# Patient Record
Sex: FEMALE | Race: WHITE | NOT HISPANIC OR LATINO
[De-identification: names, ages, dates, MRNs, and addresses within clinical notes are randomized per-mention and may not be internally consistent; named-entity substitution may affect disease eponyms.]

---

## 2017-02-08 ENCOUNTER — RESULT REVIEW (OUTPATIENT)
Age: 27
End: 2017-02-08

## 2017-02-09 ENCOUNTER — TRANSCRIPTION ENCOUNTER (OUTPATIENT)
Age: 27
End: 2017-02-09

## 2017-02-09 ENCOUNTER — INPATIENT (INPATIENT)
Facility: HOSPITAL | Age: 27
LOS: 3 days | Discharge: HOME | End: 2017-02-13
Attending: OBSTETRICS & GYNECOLOGY | Admitting: OBSTETRICS & GYNECOLOGY
Payer: COMMERCIAL

## 2017-02-09 ENCOUNTER — EMERGENCY (EMERGENCY)
Facility: HOSPITAL | Age: 27
LOS: 1 days | Discharge: STILL ADMITTED OR ADMINISTRATIVE | End: 2017-02-09
Admitting: EMERGENCY MEDICINE

## 2017-02-09 VITALS — HEIGHT: 64 IN | WEIGHT: 169.76 LBS

## 2017-02-09 VITALS
DIASTOLIC BLOOD PRESSURE: 92 MMHG | HEART RATE: 94 BPM | TEMPERATURE: 98 F | RESPIRATION RATE: 18 BRPM | OXYGEN SATURATION: 100 % | SYSTOLIC BLOOD PRESSURE: 191 MMHG

## 2017-02-09 DIAGNOSIS — O26.90 PREGNANCY RELATED CONDITIONS, UNSPECIFIED, UNSPECIFIED TRIMESTER: ICD-10-CM

## 2017-02-09 DIAGNOSIS — Z3A.00 WEEKS OF GESTATION OF PREGNANCY NOT SPECIFIED: ICD-10-CM

## 2017-02-09 LAB
ALBUMIN SERPL ELPH-MCNC: 3 G/DL — LOW (ref 3.3–5)
ALP SERPL-CCNC: 118 U/L — SIGNIFICANT CHANGE UP (ref 40–120)
ALT FLD-CCNC: 36 U/L — HIGH (ref 4–33)
APTT BLD: 25.7 SEC — LOW (ref 27.5–37.4)
AST SERPL-CCNC: 41 U/L — HIGH (ref 4–32)
BILIRUB SERPL-MCNC: 0.2 MG/DL — SIGNIFICANT CHANGE UP (ref 0.2–1.2)
BUN SERPL-MCNC: 8 MG/DL — SIGNIFICANT CHANGE UP (ref 7–23)
CALCIUM SERPL-MCNC: 10 MG/DL — SIGNIFICANT CHANGE UP (ref 8.4–10.5)
CHLORIDE SERPL-SCNC: 98 MMOL/L — SIGNIFICANT CHANGE UP (ref 98–107)
CO2 SERPL-SCNC: 18 MMOL/L — LOW (ref 22–31)
CREAT SERPL-MCNC: 0.59 MG/DL — SIGNIFICANT CHANGE UP (ref 0.5–1.3)
FIBRINOGEN PPP-MCNC: 569 MG/DL — HIGH (ref 255–510)
GLUCOSE SERPL-MCNC: 116 MG/DL — HIGH (ref 70–99)
INR BLD: 0.98 — SIGNIFICANT CHANGE UP (ref 0.87–1.18)
LDH SERPL L TO P-CCNC: 229 U/L — HIGH (ref 135–225)
POTASSIUM SERPL-MCNC: 4 MMOL/L — SIGNIFICANT CHANGE UP (ref 3.5–5.3)
POTASSIUM SERPL-SCNC: 4 MMOL/L — SIGNIFICANT CHANGE UP (ref 3.5–5.3)
PROT SERPL-MCNC: 6 G/DL — SIGNIFICANT CHANGE UP (ref 6–8.3)
PROTHROM AB SERPL-ACNC: 11.2 SEC — SIGNIFICANT CHANGE UP (ref 10–13.1)
SODIUM SERPL-SCNC: 136 MMOL/L — SIGNIFICANT CHANGE UP (ref 135–145)
URATE SERPL-MCNC: 5.4 MG/DL — SIGNIFICANT CHANGE UP (ref 2.5–7)

## 2017-02-09 PROCEDURE — 88307 TISSUE EXAM BY PATHOLOGIST: CPT | Mod: 26

## 2017-02-09 PROCEDURE — 59514 CESAREAN DELIVERY ONLY: CPT | Mod: 80,U8

## 2017-02-09 RX ORDER — SODIUM CHLORIDE 9 MG/ML
1000 INJECTION, SOLUTION INTRAVENOUS ONCE
Qty: 0 | Refills: 0 | Status: DISCONTINUED | OUTPATIENT
Start: 2017-02-09 | End: 2017-02-10

## 2017-02-09 RX ORDER — SODIUM CHLORIDE 9 MG/ML
1000 INJECTION, SOLUTION INTRAVENOUS ONCE
Qty: 0 | Refills: 0 | Status: DISCONTINUED | OUTPATIENT
Start: 2017-02-09 | End: 2017-02-09

## 2017-02-09 RX ORDER — METOCLOPRAMIDE HCL 10 MG
10 TABLET ORAL ONCE
Qty: 0 | Refills: 0 | Status: DISCONTINUED | OUTPATIENT
Start: 2017-02-09 | End: 2017-02-10

## 2017-02-09 RX ORDER — OXYCODONE HYDROCHLORIDE 5 MG/1
10 TABLET ORAL
Qty: 0 | Refills: 0 | Status: DISCONTINUED | OUTPATIENT
Start: 2017-02-10 | End: 2017-02-10

## 2017-02-09 RX ORDER — OXYCODONE HYDROCHLORIDE 5 MG/1
5 TABLET ORAL
Qty: 0 | Refills: 0 | Status: DISCONTINUED | OUTPATIENT
Start: 2017-02-10 | End: 2017-02-10

## 2017-02-09 RX ORDER — CITRIC ACID/SODIUM CITRATE 300-500 MG
30 SOLUTION, ORAL ORAL ONCE
Qty: 0 | Refills: 0 | Status: DISCONTINUED | OUTPATIENT
Start: 2017-02-09 | End: 2017-02-09

## 2017-02-09 RX ORDER — FAMOTIDINE 10 MG/ML
20 INJECTION INTRAVENOUS ONCE
Qty: 0 | Refills: 0 | Status: DISCONTINUED | OUTPATIENT
Start: 2017-02-09 | End: 2017-02-09

## 2017-02-09 RX ORDER — CITRIC ACID/SODIUM CITRATE 300-500 MG
30 SOLUTION, ORAL ORAL ONCE
Qty: 0 | Refills: 0 | Status: COMPLETED | OUTPATIENT
Start: 2017-02-09 | End: 2017-02-09

## 2017-02-09 RX ORDER — METOCLOPRAMIDE HCL 10 MG
10 TABLET ORAL ONCE
Qty: 0 | Refills: 0 | Status: DISCONTINUED | OUTPATIENT
Start: 2017-02-09 | End: 2017-02-09

## 2017-02-09 RX ORDER — SODIUM CHLORIDE 9 MG/ML
1000 INJECTION, SOLUTION INTRAVENOUS
Qty: 0 | Refills: 0 | Status: DISCONTINUED | OUTPATIENT
Start: 2017-02-09 | End: 2017-02-10

## 2017-02-09 RX ORDER — SODIUM CHLORIDE 9 MG/ML
1000 INJECTION, SOLUTION INTRAVENOUS
Qty: 0 | Refills: 0 | Status: DISCONTINUED | OUTPATIENT
Start: 2017-02-09 | End: 2017-02-09

## 2017-02-09 NOTE — ED ADULT NURSE NOTE - CHIEF COMPLAINT QUOTE
Pt brought in by [facility] c/o passage of a clot and now having vaginal bleeding x 1 1/2 hours. Denies trauma, pain or contraction. RAMIREZ 2/21/2017. L&D provider called and pt transported to L&D

## 2017-02-09 NOTE — ED ADULT TRIAGE NOTE - CHIEF COMPLAINT QUOTE
Pt brought in by [facility] c/o passage of a clot and now having vaginal bleeding x 1 1/2 hours. Denies trauma, pain or contraction. RAMIREZ 2/21/2017. L&D provider called and pt transported to L&D

## 2017-02-10 ENCOUNTER — TRANSCRIPTION ENCOUNTER (OUTPATIENT)
Age: 27
End: 2017-02-10

## 2017-02-10 LAB
BASOPHILS # BLD AUTO: 0.02 K/UL — SIGNIFICANT CHANGE UP (ref 0–0.2)
BASOPHILS NFR BLD AUTO: 0.2 % — SIGNIFICANT CHANGE UP (ref 0–2)
BASOPHILS NFR SPEC: 0 % — SIGNIFICANT CHANGE UP (ref 0–2)
EOSINOPHIL # BLD AUTO: 0.08 K/UL — SIGNIFICANT CHANGE UP (ref 0–0.5)
EOSINOPHIL NFR BLD AUTO: 0.9 % — SIGNIFICANT CHANGE UP (ref 0–6)
EOSINOPHIL NFR FLD: 0 % — SIGNIFICANT CHANGE UP (ref 0–6)
GIANT PLATELETS BLD QL SMEAR: PRESENT — SIGNIFICANT CHANGE UP
HCT VFR BLD CALC: 25 % — LOW (ref 34.5–45)
HCT VFR BLD CALC: 28.4 % — LOW (ref 34.5–45)
HCT VFR BLD CALC: 33.5 % — LOW (ref 34.5–45)
HGB BLD-MCNC: 10 G/DL — LOW (ref 11.5–15.5)
HGB BLD-MCNC: 11.6 G/DL — SIGNIFICANT CHANGE UP (ref 11.5–15.5)
HGB BLD-MCNC: 8.7 G/DL — LOW (ref 11.5–15.5)
IMM GRANULOCYTES NFR BLD AUTO: 0.7 % — SIGNIFICANT CHANGE UP (ref 0–1.5)
LYMPHOCYTES # BLD AUTO: 1.95 K/UL — SIGNIFICANT CHANGE UP (ref 1–3.3)
LYMPHOCYTES # BLD AUTO: 22.4 % — SIGNIFICANT CHANGE UP (ref 13–44)
LYMPHOCYTES NFR SPEC AUTO: 18 % — SIGNIFICANT CHANGE UP (ref 13–44)
MANUAL SMEAR VERIFICATION: SIGNIFICANT CHANGE UP
MANUAL SMEAR VERIFICATION: SIGNIFICANT CHANGE UP
MCHC RBC-ENTMCNC: 32 PG — SIGNIFICANT CHANGE UP (ref 27–34)
MCHC RBC-ENTMCNC: 32.3 PG — SIGNIFICANT CHANGE UP (ref 27–34)
MCHC RBC-ENTMCNC: 32.3 PG — SIGNIFICANT CHANGE UP (ref 27–34)
MCHC RBC-ENTMCNC: 34.6 % — SIGNIFICANT CHANGE UP (ref 32–36)
MCHC RBC-ENTMCNC: 34.8 % — SIGNIFICANT CHANGE UP (ref 32–36)
MCHC RBC-ENTMCNC: 35.2 % — SIGNIFICANT CHANGE UP (ref 32–36)
MCV RBC AUTO: 91.6 FL — SIGNIFICANT CHANGE UP (ref 80–100)
MCV RBC AUTO: 92.3 FL — SIGNIFICANT CHANGE UP (ref 80–100)
MCV RBC AUTO: 92.9 FL — SIGNIFICANT CHANGE UP (ref 80–100)
MONOCYTES # BLD AUTO: 0.94 K/UL — HIGH (ref 0–0.9)
MONOCYTES NFR BLD AUTO: 10.8 % — SIGNIFICANT CHANGE UP (ref 2–14)
MONOCYTES NFR BLD: 8 % — SIGNIFICANT CHANGE UP (ref 2–9)
MORPHOLOGY BLD-IMP: NORMAL — SIGNIFICANT CHANGE UP
NEUTROPHIL AB SER-ACNC: 73 % — SIGNIFICANT CHANGE UP (ref 43–77)
NEUTROPHILS # BLD AUTO: 5.66 K/UL — SIGNIFICANT CHANGE UP (ref 1.8–7.4)
NEUTROPHILS NFR BLD AUTO: 65 % — SIGNIFICANT CHANGE UP (ref 43–77)
NEUTS BAND # BLD: 1 % — SIGNIFICANT CHANGE UP (ref 0–6)
PLATELET # BLD AUTO: 133 K/UL — LOW (ref 150–400)
PLATELET # BLD AUTO: 135 K/UL — LOW (ref 150–400)
PLATELET # BLD AUTO: 169 K/UL — SIGNIFICANT CHANGE UP (ref 150–400)
PLATELET COUNT - ESTIMATE: NORMAL — SIGNIFICANT CHANGE UP
PMV BLD: 11.5 FL — SIGNIFICANT CHANGE UP (ref 7–13)
PMV BLD: 11.6 FL — SIGNIFICANT CHANGE UP (ref 7–13)
PMV BLD: 11.9 FL — SIGNIFICANT CHANGE UP (ref 7–13)
RBC # BLD: 2.69 M/UL — LOW (ref 3.8–5.2)
RBC # BLD: 3.1 M/UL — LOW (ref 3.8–5.2)
RBC # BLD: 3.63 M/UL — LOW (ref 3.8–5.2)
RBC # FLD: 12.8 % — SIGNIFICANT CHANGE UP (ref 10.3–14.5)
RBC # FLD: 12.8 % — SIGNIFICANT CHANGE UP (ref 10.3–14.5)
RBC # FLD: 13.1 % — SIGNIFICANT CHANGE UP (ref 10.3–14.5)
RH IG SCN BLD-IMP: POSITIVE — SIGNIFICANT CHANGE UP
T PALLIDUM AB TITR SER: NEGATIVE — SIGNIFICANT CHANGE UP
WBC # BLD: 11.05 K/UL — HIGH (ref 3.8–10.5)
WBC # BLD: 14.8 K/UL — HIGH (ref 3.8–10.5)
WBC # BLD: 8.71 K/UL — SIGNIFICANT CHANGE UP (ref 3.8–10.5)
WBC # FLD AUTO: 11.05 K/UL — HIGH (ref 3.8–10.5)
WBC # FLD AUTO: 14.8 K/UL — HIGH (ref 3.8–10.5)
WBC # FLD AUTO: 8.71 K/UL — SIGNIFICANT CHANGE UP (ref 3.8–10.5)

## 2017-02-10 RX ORDER — IBUPROFEN 200 MG
1 TABLET ORAL
Qty: 60 | Refills: 1
Start: 2017-02-10 | End: 2017-03-11

## 2017-02-10 RX ORDER — FERROUS SULFATE 325(65) MG
325 TABLET ORAL DAILY
Qty: 0 | Refills: 0 | Status: DISCONTINUED | OUTPATIENT
Start: 2017-02-10 | End: 2017-02-13

## 2017-02-10 RX ORDER — SODIUM CHLORIDE 9 MG/ML
1000 INJECTION, SOLUTION INTRAVENOUS ONCE
Qty: 0 | Refills: 0 | Status: COMPLETED | OUTPATIENT
Start: 2017-02-10 | End: 2017-02-10

## 2017-02-10 RX ORDER — DOCUSATE SODIUM 100 MG
100 CAPSULE ORAL
Qty: 0 | Refills: 0 | Status: DISCONTINUED | OUTPATIENT
Start: 2017-02-10 | End: 2017-02-13

## 2017-02-10 RX ORDER — OXYTOCIN 10 UNIT/ML
41.67 VIAL (ML) INJECTION
Qty: 20 | Refills: 0 | Status: DISCONTINUED | OUTPATIENT
Start: 2017-02-10 | End: 2017-02-10

## 2017-02-10 RX ORDER — SODIUM CHLORIDE 9 MG/ML
1000 INJECTION, SOLUTION INTRAVENOUS
Qty: 0 | Refills: 0 | Status: DISCONTINUED | OUTPATIENT
Start: 2017-02-10 | End: 2017-02-13

## 2017-02-10 RX ORDER — TETANUS TOXOID, REDUCED DIPHTHERIA TOXOID AND ACELLULAR PERTUSSIS VACCINE, ADSORBED 5; 2.5; 8; 8; 2.5 [IU]/.5ML; [IU]/.5ML; UG/.5ML; UG/.5ML; UG/.5ML
0.5 SUSPENSION INTRAMUSCULAR ONCE
Qty: 0 | Refills: 0 | Status: DISCONTINUED | OUTPATIENT
Start: 2017-02-10 | End: 2017-02-13

## 2017-02-10 RX ORDER — DIPHENHYDRAMINE HCL 50 MG
25 CAPSULE ORAL EVERY 6 HOURS
Qty: 0 | Refills: 0 | Status: DISCONTINUED | OUTPATIENT
Start: 2017-02-10 | End: 2017-02-13

## 2017-02-10 RX ORDER — IBUPROFEN 200 MG
600 TABLET ORAL EVERY 6 HOURS
Qty: 0 | Refills: 0 | Status: DISCONTINUED | OUTPATIENT
Start: 2017-02-10 | End: 2017-02-13

## 2017-02-10 RX ORDER — GLYCERIN ADULT
1 SUPPOSITORY, RECTAL RECTAL AT BEDTIME
Qty: 0 | Refills: 0 | Status: DISCONTINUED | OUTPATIENT
Start: 2017-02-10 | End: 2017-02-13

## 2017-02-10 RX ORDER — HEPARIN SODIUM 5000 [USP'U]/ML
5000 INJECTION INTRAVENOUS; SUBCUTANEOUS EVERY 12 HOURS
Qty: 0 | Refills: 0 | Status: DISCONTINUED | OUTPATIENT
Start: 2017-02-10 | End: 2017-02-13

## 2017-02-10 RX ORDER — LANOLIN
1 OINTMENT (GRAM) TOPICAL
Qty: 0 | Refills: 0 | Status: DISCONTINUED | OUTPATIENT
Start: 2017-02-10 | End: 2017-02-13

## 2017-02-10 RX ORDER — SODIUM CHLORIDE 9 MG/ML
1000 INJECTION, SOLUTION INTRAVENOUS
Qty: 0 | Refills: 0 | Status: DISCONTINUED | OUTPATIENT
Start: 2017-02-10 | End: 2017-02-10

## 2017-02-10 RX ORDER — OXYCODONE HYDROCHLORIDE 5 MG/1
5 TABLET ORAL EVERY 4 HOURS
Qty: 0 | Refills: 0 | Status: DISCONTINUED | OUTPATIENT
Start: 2017-02-10 | End: 2017-02-10

## 2017-02-10 RX ORDER — SIMETHICONE 80 MG/1
80 TABLET, CHEWABLE ORAL EVERY 4 HOURS
Qty: 0 | Refills: 0 | Status: DISCONTINUED | OUTPATIENT
Start: 2017-02-10 | End: 2017-02-13

## 2017-02-10 RX ORDER — OXYCODONE AND ACETAMINOPHEN 5; 325 MG/1; MG/1
1 TABLET ORAL
Qty: 28 | Refills: 0
Start: 2017-02-10 | End: 2017-02-17

## 2017-02-10 RX ORDER — OXYCODONE HYDROCHLORIDE 5 MG/1
5 TABLET ORAL
Qty: 0 | Refills: 0 | Status: DISCONTINUED | OUTPATIENT
Start: 2017-02-10 | End: 2017-02-13

## 2017-02-10 RX ORDER — KETOROLAC TROMETHAMINE 30 MG/ML
30 SYRINGE (ML) INJECTION EVERY 6 HOURS
Qty: 0 | Refills: 0 | Status: DISCONTINUED | OUTPATIENT
Start: 2017-02-10 | End: 2017-02-11

## 2017-02-10 RX ORDER — ACETAMINOPHEN 500 MG
975 TABLET ORAL EVERY 6 HOURS
Qty: 0 | Refills: 0 | Status: DISCONTINUED | OUTPATIENT
Start: 2017-02-10 | End: 2017-02-13

## 2017-02-10 RX ORDER — OXYCODONE HYDROCHLORIDE 5 MG/1
5 TABLET ORAL EVERY 4 HOURS
Qty: 0 | Refills: 0 | Status: DISCONTINUED | OUTPATIENT
Start: 2017-02-10 | End: 2017-02-13

## 2017-02-10 RX ORDER — OXYTOCIN 10 UNIT/ML
333.33 VIAL (ML) INJECTION
Qty: 20 | Refills: 0 | Status: DISCONTINUED | OUTPATIENT
Start: 2017-02-10 | End: 2017-02-10

## 2017-02-10 RX ADMIN — Medication 975 MILLIGRAM(S): at 19:29

## 2017-02-10 RX ADMIN — Medication 30 MILLIGRAM(S): at 14:13

## 2017-02-10 RX ADMIN — SODIUM CHLORIDE 2000 MILLILITER(S): 9 INJECTION, SOLUTION INTRAVENOUS at 02:22

## 2017-02-10 RX ADMIN — Medication 30 MILLIGRAM(S): at 07:50

## 2017-02-10 RX ADMIN — HEPARIN SODIUM 5000 UNIT(S): 5000 INJECTION INTRAVENOUS; SUBCUTANEOUS at 18:59

## 2017-02-10 RX ADMIN — Medication 325 MILLIGRAM(S): at 14:13

## 2017-02-10 RX ADMIN — OXYCODONE HYDROCHLORIDE 5 MILLIGRAM(S): 5 TABLET ORAL at 18:59

## 2017-02-10 RX ADMIN — Medication 30 MILLIGRAM(S): at 14:43

## 2017-02-10 RX ADMIN — Medication 975 MILLIGRAM(S): at 18:59

## 2017-02-10 RX ADMIN — OXYCODONE HYDROCHLORIDE 5 MILLIGRAM(S): 5 TABLET ORAL at 19:29

## 2017-02-10 RX ADMIN — Medication 30 MILLIGRAM(S): at 06:55

## 2017-02-10 RX ADMIN — HEPARIN SODIUM 5000 UNIT(S): 5000 INJECTION INTRAVENOUS; SUBCUTANEOUS at 05:15

## 2017-02-10 RX ADMIN — Medication 1 TABLET(S): at 14:14

## 2017-02-10 NOTE — DISCHARGE NOTE OB - MEDICATION SUMMARY - MEDICATIONS TO TAKE
I will START or STAY ON the medications listed below when I get home from the hospital:    ibuprofen 600 mg oral tablet  -- 1 tab(s) by mouth every 6 hours, As Needed -for moderate pain  -- Do not take this drug if you are pregnant.  It is very important that you take or use this exactly as directed.  Do not skip doses or discontinue unless directed by your doctor.  May cause drowsiness or dizziness.  Obtain medical advice before taking any non-prescription drugs as some may affect the action of this medication.  Take with food or milk.    -- Indication: For Pain    Percocet 5/325 325 mg-5 mg oral tablet  -- 1 tab(s) by mouth every 6 hours, As Needed -for severe pain MDD:4 tabs  -- Caution federal law prohibits the transfer of this drug to any person other  than the person for whom it was prescribed.  May cause drowsiness.  Alcohol may intensify this effect.  Use care when operating dangerous machinery.  This prescription cannot be refilled.  This product contains acetaminophen.  Do not use  with any other product containing acetaminophen to prevent possible liver damage.  Using more of this medication than prescribed may cause serious breathing problems.    -- Indication: For Pain

## 2017-02-10 NOTE — DISCHARGE NOTE OB - PATIENT PORTAL LINK FT
“You can access the Follow[employer] Patient Portal, offered by [facility], by registering with the following website: [email or web address]”

## 2017-02-10 NOTE — DISCHARGE NOTE OB - CARE PLAN
Principal Discharge DX:	 delivery delivered  Goal:	Routine  Instructions for follow-up, activity and diet:	Regular

## 2017-02-10 NOTE — DISCHARGE NOTE OB - CARE PROVIDER_API CALL
Vaishali Antunez (MD), OBSGYN  General  [address] [place] [address]  Phone: [phone]  Fax: [phone]

## 2017-02-11 RX ADMIN — Medication 600 MILLIGRAM(S): at 01:20

## 2017-02-11 RX ADMIN — OXYCODONE HYDROCHLORIDE 5 MILLIGRAM(S): 5 TABLET ORAL at 00:33

## 2017-02-11 RX ADMIN — Medication 100 MILLIGRAM(S): at 00:33

## 2017-02-11 RX ADMIN — Medication 975 MILLIGRAM(S): at 19:49

## 2017-02-11 RX ADMIN — Medication 600 MILLIGRAM(S): at 18:08

## 2017-02-11 RX ADMIN — Medication 975 MILLIGRAM(S): at 06:26

## 2017-02-11 RX ADMIN — Medication 325 MILLIGRAM(S): at 19:47

## 2017-02-11 RX ADMIN — HEPARIN SODIUM 5000 UNIT(S): 5000 INJECTION INTRAVENOUS; SUBCUTANEOUS at 18:08

## 2017-02-11 RX ADMIN — Medication 1 TABLET(S): at 19:49

## 2017-02-11 RX ADMIN — Medication 975 MILLIGRAM(S): at 05:52

## 2017-02-11 RX ADMIN — Medication 600 MILLIGRAM(S): at 19:48

## 2017-02-11 RX ADMIN — OXYCODONE HYDROCHLORIDE 5 MILLIGRAM(S): 5 TABLET ORAL at 01:20

## 2017-02-11 RX ADMIN — Medication 975 MILLIGRAM(S): at 18:09

## 2017-02-11 RX ADMIN — SIMETHICONE 80 MILLIGRAM(S): 80 TABLET, CHEWABLE ORAL at 02:33

## 2017-02-11 RX ADMIN — Medication 600 MILLIGRAM(S): at 05:52

## 2017-02-11 RX ADMIN — OXYCODONE HYDROCHLORIDE 5 MILLIGRAM(S): 5 TABLET ORAL at 10:54

## 2017-02-11 RX ADMIN — HEPARIN SODIUM 5000 UNIT(S): 5000 INJECTION INTRAVENOUS; SUBCUTANEOUS at 05:52

## 2017-02-11 RX ADMIN — OXYCODONE HYDROCHLORIDE 5 MILLIGRAM(S): 5 TABLET ORAL at 05:52

## 2017-02-11 RX ADMIN — Medication 600 MILLIGRAM(S): at 00:33

## 2017-02-11 RX ADMIN — OXYCODONE HYDROCHLORIDE 5 MILLIGRAM(S): 5 TABLET ORAL at 11:15

## 2017-02-11 RX ADMIN — Medication 975 MILLIGRAM(S): at 00:34

## 2017-02-11 RX ADMIN — Medication 600 MILLIGRAM(S): at 06:26

## 2017-02-11 RX ADMIN — Medication 975 MILLIGRAM(S): at 01:20

## 2017-02-11 RX ADMIN — OXYCODONE HYDROCHLORIDE 5 MILLIGRAM(S): 5 TABLET ORAL at 06:26

## 2017-02-11 NOTE — LACTATION INITIAL EVALUATION - INTERVENTION OUTCOME
Worked with mother to try to latch  and  not latching and over 24 hours , mother to triple feed, discussed to breastfeed 8-12 times in 24 hours and then hand express and give 10-15cc of colostrum and if not getting enough to give formula, discussed breastfeeding log and assist prn, Rn aware of plan/demonstrates understanding of teaching/good return demonstration/verbalizes understanding

## 2017-02-12 RX ADMIN — Medication 600 MILLIGRAM(S): at 12:46

## 2017-02-12 RX ADMIN — Medication 600 MILLIGRAM(S): at 07:35

## 2017-02-12 RX ADMIN — Medication 1 TABLET(S): at 12:19

## 2017-02-12 RX ADMIN — Medication 975 MILLIGRAM(S): at 12:45

## 2017-02-12 RX ADMIN — Medication 100 MILLIGRAM(S): at 21:09

## 2017-02-12 RX ADMIN — Medication 975 MILLIGRAM(S): at 01:40

## 2017-02-12 RX ADMIN — Medication 600 MILLIGRAM(S): at 06:36

## 2017-02-12 RX ADMIN — Medication 600 MILLIGRAM(S): at 23:55

## 2017-02-12 RX ADMIN — Medication 975 MILLIGRAM(S): at 12:20

## 2017-02-12 RX ADMIN — Medication 975 MILLIGRAM(S): at 18:19

## 2017-02-12 RX ADMIN — Medication 600 MILLIGRAM(S): at 00:44

## 2017-02-12 RX ADMIN — Medication 975 MILLIGRAM(S): at 06:35

## 2017-02-12 RX ADMIN — HEPARIN SODIUM 5000 UNIT(S): 5000 INJECTION INTRAVENOUS; SUBCUTANEOUS at 06:35

## 2017-02-12 RX ADMIN — Medication 600 MILLIGRAM(S): at 12:19

## 2017-02-12 RX ADMIN — Medication 100 MILLIGRAM(S): at 00:44

## 2017-02-12 RX ADMIN — Medication 325 MILLIGRAM(S): at 12:19

## 2017-02-12 RX ADMIN — Medication 600 MILLIGRAM(S): at 01:40

## 2017-02-12 RX ADMIN — Medication 975 MILLIGRAM(S): at 23:56

## 2017-02-12 RX ADMIN — SIMETHICONE 80 MILLIGRAM(S): 80 TABLET, CHEWABLE ORAL at 01:45

## 2017-02-12 RX ADMIN — Medication 975 MILLIGRAM(S): at 19:00

## 2017-02-12 RX ADMIN — Medication 975 MILLIGRAM(S): at 00:43

## 2017-02-12 RX ADMIN — HEPARIN SODIUM 5000 UNIT(S): 5000 INJECTION INTRAVENOUS; SUBCUTANEOUS at 18:20

## 2017-02-12 RX ADMIN — Medication 975 MILLIGRAM(S): at 07:35

## 2017-02-12 RX ADMIN — Medication 600 MILLIGRAM(S): at 19:00

## 2017-02-12 RX ADMIN — Medication 600 MILLIGRAM(S): at 18:18

## 2017-02-13 VITALS
TEMPERATURE: 98 F | OXYGEN SATURATION: 99 % | DIASTOLIC BLOOD PRESSURE: 67 MMHG | HEART RATE: 73 BPM | SYSTOLIC BLOOD PRESSURE: 116 MMHG | RESPIRATION RATE: 18 BRPM

## 2017-02-13 RX ADMIN — HEPARIN SODIUM 5000 UNIT(S): 5000 INJECTION INTRAVENOUS; SUBCUTANEOUS at 06:52

## 2017-02-13 RX ADMIN — Medication 600 MILLIGRAM(S): at 06:45

## 2017-02-13 RX ADMIN — Medication 325 MILLIGRAM(S): at 13:25

## 2017-02-13 RX ADMIN — Medication 975 MILLIGRAM(S): at 06:45

## 2017-02-13 RX ADMIN — Medication 600 MILLIGRAM(S): at 14:00

## 2017-02-13 RX ADMIN — Medication 1 APPLICATION(S): at 13:25

## 2017-02-13 RX ADMIN — Medication 975 MILLIGRAM(S): at 07:45

## 2017-02-13 RX ADMIN — Medication 1 TABLET(S): at 13:24

## 2017-02-13 RX ADMIN — Medication 600 MILLIGRAM(S): at 07:45

## 2017-02-13 RX ADMIN — Medication 600 MILLIGRAM(S): at 13:25

## 2017-02-13 RX ADMIN — Medication 975 MILLIGRAM(S): at 13:25

## 2017-02-13 RX ADMIN — Medication 975 MILLIGRAM(S): at 14:00

## 2017-02-13 RX ADMIN — Medication 975 MILLIGRAM(S): at 00:55

## 2017-02-13 RX ADMIN — Medication 600 MILLIGRAM(S): at 00:55

## 2017-03-07 ENCOUNTER — TRANSCRIPTION ENCOUNTER (OUTPATIENT)
Age: 27
End: 2017-03-07

## 2018-08-17 NOTE — PATIENT PROFILE OB - NSTOBACCONEVERSMOKERY/N_GEN_A
Please notify patient that her Lyme antibody screen was negative.  Would suggest that she follow-up with her primary care provider if she wants to do a repeat Lyme titer in 6 weeks as we discussed.  Dot Izaguirre NP     No

## 2019-04-23 ENCOUNTER — TRANSCRIPTION ENCOUNTER (OUTPATIENT)
Age: 29
End: 2019-04-23

## 2022-10-07 ENCOUNTER — ASOB RESULT (OUTPATIENT)
Age: 32
End: 2022-10-07

## 2022-10-07 ENCOUNTER — APPOINTMENT (OUTPATIENT)
Dept: ANTEPARTUM | Facility: CLINIC | Age: 32
End: 2022-10-07

## 2022-10-07 PROCEDURE — 76813 OB US NUCHAL MEAS 1 GEST: CPT

## 2022-11-18 ENCOUNTER — APPOINTMENT (OUTPATIENT)
Dept: ANTEPARTUM | Facility: CLINIC | Age: 32
End: 2022-11-18

## 2022-11-18 ENCOUNTER — ASOB RESULT (OUTPATIENT)
Age: 32
End: 2022-11-18

## 2022-11-18 PROCEDURE — 99202 OFFICE O/P NEW SF 15 MIN: CPT | Mod: 25

## 2022-11-18 PROCEDURE — 76811 OB US DETAILED SNGL FETUS: CPT

## 2023-01-06 ENCOUNTER — APPOINTMENT (OUTPATIENT)
Dept: ANTEPARTUM | Facility: CLINIC | Age: 33
End: 2023-01-06
Payer: COMMERCIAL

## 2023-01-06 ENCOUNTER — ASOB RESULT (OUTPATIENT)
Age: 33
End: 2023-01-06

## 2023-01-06 PROCEDURE — 76816 OB US FOLLOW-UP PER FETUS: CPT

## 2023-01-06 PROCEDURE — 76819 FETAL BIOPHYS PROFIL W/O NST: CPT | Mod: 59

## 2023-02-02 ENCOUNTER — ASOB RESULT (OUTPATIENT)
Age: 33
End: 2023-02-02

## 2023-02-02 ENCOUNTER — APPOINTMENT (OUTPATIENT)
Dept: ANTEPARTUM | Facility: CLINIC | Age: 33
End: 2023-02-02
Payer: COMMERCIAL

## 2023-02-02 PROCEDURE — 76816 OB US FOLLOW-UP PER FETUS: CPT

## 2023-02-02 PROCEDURE — 76817 TRANSVAGINAL US OBSTETRIC: CPT

## 2023-02-02 PROCEDURE — 76819 FETAL BIOPHYS PROFIL W/O NST: CPT | Mod: 59

## 2023-02-27 ENCOUNTER — ASOB RESULT (OUTPATIENT)
Age: 33
End: 2023-02-27

## 2023-02-27 ENCOUNTER — APPOINTMENT (OUTPATIENT)
Dept: ANTEPARTUM | Facility: CLINIC | Age: 33
End: 2023-02-27
Payer: COMMERCIAL

## 2023-02-27 PROCEDURE — 76816 OB US FOLLOW-UP PER FETUS: CPT

## 2023-02-27 PROCEDURE — 76819 FETAL BIOPHYS PROFIL W/O NST: CPT | Mod: 59

## 2023-03-07 ENCOUNTER — APPOINTMENT (OUTPATIENT)
Dept: ANTEPARTUM | Facility: CLINIC | Age: 33
End: 2023-03-07

## 2023-03-14 ENCOUNTER — APPOINTMENT (OUTPATIENT)
Dept: ANTEPARTUM | Facility: CLINIC | Age: 33
End: 2023-03-14

## 2023-03-14 ENCOUNTER — ASOB RESULT (OUTPATIENT)
Age: 33
End: 2023-03-14

## 2023-03-14 ENCOUNTER — EMERGENCY (EMERGENCY)
Facility: HOSPITAL | Age: 33
LOS: 1 days | Discharge: STILL ADMITTED OR ADMINISTRATIVE | End: 2023-03-14
Admitting: EMERGENCY MEDICINE
Payer: COMMERCIAL

## 2023-03-14 ENCOUNTER — OUTPATIENT (OUTPATIENT)
Dept: INPATIENT UNIT | Facility: HOSPITAL | Age: 33
LOS: 1 days | Discharge: HOME | End: 2023-03-14
Payer: COMMERCIAL

## 2023-03-14 VITALS
HEART RATE: 84 BPM | DIASTOLIC BLOOD PRESSURE: 66 MMHG | TEMPERATURE: 98 F | RESPIRATION RATE: 26 BRPM | SYSTOLIC BLOOD PRESSURE: 109 MMHG

## 2023-03-14 VITALS
SYSTOLIC BLOOD PRESSURE: 125 MMHG | TEMPERATURE: 98 F | DIASTOLIC BLOOD PRESSURE: 89 MMHG | OXYGEN SATURATION: 100 % | RESPIRATION RATE: 17 BRPM | HEART RATE: 89 BPM

## 2023-03-14 VITALS — DIASTOLIC BLOOD PRESSURE: 64 MMHG | HEART RATE: 90 BPM | SYSTOLIC BLOOD PRESSURE: 110 MMHG

## 2023-03-14 DIAGNOSIS — O26.899 OTHER SPECIFIED PREGNANCY RELATED CONDITIONS, UNSPECIFIED TRIMESTER: ICD-10-CM

## 2023-03-14 PROCEDURE — 99221 1ST HOSP IP/OBS SF/LOW 40: CPT

## 2023-03-14 PROCEDURE — L9996: CPT

## 2023-03-14 NOTE — OB PROVIDER TRIAGE NOTE - NSOBPROVIDERNOTE_OBGYN_ALL_OB_FT
Ms. LEON PATTERSONNARAEMMY is a 32y  35w6d referred from [facility] for 2 minute deceleration in office, unsure if with contraction. Ms. LEON ARTHUR is a 32y  35w6d referred from [facility] for 2 minute deceleration in office, unsure if with contraction with reassuring fetal status at this time.     Plan  - Patient to be discharged home with follow up and return precautions  - Please follow up with your obstetrician at your next scheduled appointment.   - Please return for decreased / no fetal movement, vaginal bleeding similar to that of a period, leaking / gush of fluid, regular contractions occurring 4-5 minutes  for one hour or requiring pain medication   - Patient and partner and educated of plan and demonstrate understanding. All questions answered. Discharge instructions provided and signed.     Plan d/w Dr. Najera

## 2023-03-14 NOTE — ED ADULT TRIAGE NOTE - CHIEF COMPLAINT QUOTE
Pt arrives from [facility], went for stress test this morning and during test fetal heart rate noted drop. Pt is 35 weeks pregnant. Pt is . RAMIREZ 23. Pt says she is high risk pregnancy. Endorsing some lower abdominal cramping. Denies any other Hx.

## 2023-03-14 NOTE — OB PROVIDER TRIAGE NOTE - HISTORY OF PRESENT ILLNESS
Ms. LEON ARTHUR is a 32y  35w6d referred from [facility] for 2 minute deceleration in office, unsure if with contraction. Receives weekly testing since 34w due to history of placental abruption with first pregnancy in 2017.   PNC: WHP. Weekly NST / BPP due to hx of placenta abruption. Pt reports no hospitalizations, procedures, infections, or new diagnoses in pregnancy. Pt denies complications with blood pressure and/or blood sugar. Ms. LEON ARTHUR is a 32y  35w6d referred from [facility] for 2 minute deceleration in office, unsure if with contraction. Receives weekly testing since 34w due to history of placental abruption with first pregnancy in 2017. + Intermittent tightening x 2 weeks, has not been checked during pregnancy. + FM. Denies LOF / VB.   PNC: WHP. Weekly NST / BPP due to hx of placenta abruption. Pt reports no hospitalizations, procedures, infections, or new diagnoses in pregnancy. Pt denies complications with blood pressure and/or blood sugar.

## 2023-03-14 NOTE — OB PROVIDER TRIAGE NOTE - NSHPPHYSICALEXAM_GEN_ALL_CORE
T(C): 36.6 (03-14-23 @ 10:44), Max: 36.8 (03-14-23 @ 09:35)  HR: 81 (03-14-23 @ 12:25) (81 - 89)  BP: 127/74 (03-14-23 @ 12:25) (109/66 - 127/74)  RR: 26 (03-14-23 @ 10:44) (17 - 26)  SpO2: 100% (03-14-23 @ 09:35) (100% - 100%)  General: Female sitting comfortably in no apparent distress.   Head: Normocephalic. Atraumatic.   Eyes: No discharge, lids normal, conjunctiva normal  Lungs: No resp distress  Abdomen: Soft, nontender. Gravid.   TAUS:  Sono saved in ASOB.   Neuro: No facial asymmetry, no slurred speech, moves all 4 extremities  Mood: Alert and lucid, appropriate mood and affect

## 2023-03-16 DIAGNOSIS — Z87.59 PERSONAL HISTORY OF OTHER COMPLICATIONS OF PREGNANCY, CHILDBIRTH AND THE PUERPERIUM: ICD-10-CM

## 2023-03-16 DIAGNOSIS — O36.8330 MATERNAL CARE FOR ABNORMALITIES OF THE FETAL HEART RATE OR RHYTHM, THIRD TRIMESTER, NOT APPLICABLE OR UNSPECIFIED: ICD-10-CM

## 2023-03-16 DIAGNOSIS — O34.219 MATERNAL CARE FOR UNSPECIFIED TYPE SCAR FROM PREVIOUS CESAREAN DELIVERY: ICD-10-CM

## 2023-03-16 DIAGNOSIS — Z3A.35 35 WEEKS GESTATION OF PREGNANCY: ICD-10-CM

## 2023-03-16 DIAGNOSIS — E28.2 POLYCYSTIC OVARIAN SYNDROME: ICD-10-CM

## 2023-03-16 DIAGNOSIS — O26.893 OTHER SPECIFIED PREGNANCY RELATED CONDITIONS, THIRD TRIMESTER: ICD-10-CM

## 2023-03-16 DIAGNOSIS — R19.30 ABDOMINAL RIGIDITY, UNSPECIFIED SITE: ICD-10-CM

## 2023-03-16 DIAGNOSIS — O99.283 ENDOCRINE, NUTRITIONAL AND METABOLIC DISEASES COMPLICATING PREGNANCY, THIRD TRIMESTER: ICD-10-CM

## 2023-03-21 ENCOUNTER — APPOINTMENT (OUTPATIENT)
Dept: ANTEPARTUM | Facility: CLINIC | Age: 33
End: 2023-03-21

## 2023-03-28 ENCOUNTER — OUTPATIENT (OUTPATIENT)
Dept: OUTPATIENT SERVICES | Facility: HOSPITAL | Age: 33
LOS: 1 days | End: 2023-03-28

## 2023-03-28 ENCOUNTER — ASOB RESULT (OUTPATIENT)
Age: 33
End: 2023-03-28

## 2023-03-28 ENCOUNTER — APPOINTMENT (OUTPATIENT)
Dept: ANTEPARTUM | Facility: CLINIC | Age: 33
End: 2023-03-28
Payer: COMMERCIAL

## 2023-03-28 ENCOUNTER — APPOINTMENT (OUTPATIENT)
Dept: ANTEPARTUM | Facility: CLINIC | Age: 33
End: 2023-03-28

## 2023-03-28 VITALS
OXYGEN SATURATION: 99 % | RESPIRATION RATE: 16 BRPM | DIASTOLIC BLOOD PRESSURE: 72 MMHG | HEART RATE: 89 BPM | SYSTOLIC BLOOD PRESSURE: 119 MMHG | TEMPERATURE: 98 F | HEIGHT: 63 IN | WEIGHT: 160.06 LBS

## 2023-03-28 DIAGNOSIS — Z34.90 ENCOUNTER FOR SUPERVISION OF NORMAL PREGNANCY, UNSPECIFIED, UNSPECIFIED TRIMESTER: ICD-10-CM

## 2023-03-28 DIAGNOSIS — K08.89 OTHER SPECIFIED DISORDERS OF TEETH AND SUPPORTING STRUCTURES: ICD-10-CM

## 2023-03-28 LAB
APPEARANCE UR: CLEAR — SIGNIFICANT CHANGE UP
BILIRUB UR-MCNC: NEGATIVE — SIGNIFICANT CHANGE UP
BLD GP AB SCN SERPL QL: NEGATIVE — SIGNIFICANT CHANGE UP
COLOR SPEC: SIGNIFICANT CHANGE UP
DIFF PNL FLD: ABNORMAL
GLUCOSE UR QL: NEGATIVE — SIGNIFICANT CHANGE UP
HCT VFR BLD CALC: 37.3 % — SIGNIFICANT CHANGE UP (ref 34.5–45)
HGB BLD-MCNC: 12.3 G/DL — SIGNIFICANT CHANGE UP (ref 11.5–15.5)
KETONES UR-MCNC: NEGATIVE — SIGNIFICANT CHANGE UP
LEUKOCYTE ESTERASE UR-ACNC: ABNORMAL
MCHC RBC-ENTMCNC: 29.9 PG — SIGNIFICANT CHANGE UP (ref 27–34)
MCHC RBC-ENTMCNC: 33 GM/DL — SIGNIFICANT CHANGE UP (ref 32–36)
MCV RBC AUTO: 90.5 FL — SIGNIFICANT CHANGE UP (ref 80–100)
NITRITE UR-MCNC: NEGATIVE — SIGNIFICANT CHANGE UP
NRBC # BLD: 0 /100 WBCS — SIGNIFICANT CHANGE UP (ref 0–0)
NRBC # FLD: 0 K/UL — SIGNIFICANT CHANGE UP (ref 0–0)
PH UR: 7 — SIGNIFICANT CHANGE UP (ref 5–8)
PLATELET # BLD AUTO: 178 K/UL — SIGNIFICANT CHANGE UP (ref 150–400)
PROT UR-MCNC: NEGATIVE — SIGNIFICANT CHANGE UP
RBC # BLD: 4.12 M/UL — SIGNIFICANT CHANGE UP (ref 3.8–5.2)
RBC # FLD: 15 % — HIGH (ref 10.3–14.5)
RH IG SCN BLD-IMP: POSITIVE — SIGNIFICANT CHANGE UP
SP GR SPEC: 1.01 — LOW (ref 1.01–1.05)
UROBILINOGEN FLD QL: SIGNIFICANT CHANGE UP
WBC # BLD: 6.87 K/UL — SIGNIFICANT CHANGE UP (ref 3.8–10.5)
WBC # FLD AUTO: 6.87 K/UL — SIGNIFICANT CHANGE UP (ref 3.8–10.5)

## 2023-03-28 PROCEDURE — 76816 OB US FOLLOW-UP PER FETUS: CPT

## 2023-03-28 RX ORDER — BENZOYL PEROXIDE MICRONIZED 5.8 %
0 TOWELETTE (EA) TOPICAL
Qty: 0 | Refills: 0 | DISCHARGE

## 2023-03-28 RX ORDER — FERROUS SULFATE 325(65) MG
0 TABLET ORAL
Refills: 0 | DISCHARGE

## 2023-03-28 NOTE — OB PST NOTE - PROBLEM SELECTOR PLAN 1
Patient tentatively scheduled for repeat  section on 23.  Pre-op instructions provided. Pt given verbal and written instructions with teach back on chlorhexidine wash. Pt verbalized understanding with return demonstration.   Preop Covid PCR test ordered .Instructions regarding covid PCR test to be obtained 3- 5 days prior to surgery and locations for covid testing site provided. Pt verbalized understanding .  Patient was given education to watch video on pain management , drink Gatorade prior to coming to the hospital  on the day of surgery.   .

## 2023-03-28 NOTE — OB PST NOTE - NSHPREVIEWOFSYSTEMS_GEN_ALL_CORE
General: No fever, chills, sweating, anorexia, weight loss or weight gain.   Skin: No rashes, itching, or dryness. No change in size/color of moles. No tumors, brittle nails, pitted nails, or hair loss    Breast: No tenderness, lumps, or nipple discharge      Ophthalmologic: No diplopia, photophobia, lacrimation, blurred Vision , or eye discharge    ENMT Symptoms: No hearing difficulty, ear pain, tinnitus, or vertigo. No sinus symptoms, nasal congestion, nasal   discharge, or nasal obstruction    Respiratory and Thorax: No wheezing, dyspnea, cough, hemoptysis, or pleuritic chest Pain     Cardiovascular: No chest pain, palpitations, dyspnea on exertion, orthopnea, paroxysmal nocturnal dyspnea,   peripheral edema, or claudication    Gastrointestinal: No nausea, vomiting, diarrhea, constipation, change in bowel habits, flatulence, abdominal pain, or melena    Genitourinary/ Pelvis: No hematuria, renal colic, or flank pain.  No urine discoloration, incontinence, dysuria, or urinary hesitancy. Normal urinary frequency. No nocturia, abnormal vaginal bleeding, vaginal discharge, spotting, pelvic pain, or vaginal leakage    Musculoskeletal: No arthralgia, arthritis, joint swelling, muscle cramping, muscle weakness, neck pain, arm pain, or leg pain    Neurological: No transient paralysis, weakness, paresthesias, or seizures. No syncope, tremors, vertigo, loss of sensation, difficulty walking, loss of consciousness, hemiparesis, confusion, or facial palsy    Psychiatric: No suicidal ideation, depression, anxiety, insomnia, memory loss, paranoia, mood swings, agitation, hallucinations, or hyperactivity    Hematology: No gum bleeding, nose bleeding, or skin lumps    Lymphatic: No enlarged or tender lymph nodes. No extremity swelling    Endocrine: No heat or cold intolerance    Immunologic: No recurrent or persistent infections

## 2023-03-28 NOTE — OB PST NOTE - FALL HARM RISK - UNIVERSAL INTERVENTIONS
Bed in lowest position, wheels locked, appropriate side rails in place/Call bell, personal items and telephone in reach/Instruct patient to call for assistance before getting out of bed or chair/Non-slip footwear when patient is out of bed/Baker to call system/Physically safe environment - no spills, clutter or unnecessary equipment/Purposeful Proactive Rounding/Room/bathroom lighting operational, light cord in reach

## 2023-03-28 NOTE — OB PST NOTE - PROBLEM SELECTOR PLAN 2
Patient with two front upper loose tooth.  Patient does not have a dentist or PCP .  To follow up with patient to get information regarding dentist   Email send to surgeon. Copy in chart

## 2023-03-28 NOTE — OB PST NOTE - NSHPPHYSICALEXAM_GEN_ALL_CORE

## 2023-03-28 NOTE — OB PST NOTE - HISTORY OF PRESENT ILLNESS
32 year old pregnant female presents to presurgical testing scheduled for repeat Caesarean section. Patient denies vaginal  bleeding, spotting or leakage of amniotic fluid. Patient denies regular contractions. Patient reports positive fetal movement.

## 2023-04-02 ENCOUNTER — NON-APPOINTMENT (OUTPATIENT)
Age: 33
End: 2023-04-02

## 2023-04-04 ENCOUNTER — APPOINTMENT (OUTPATIENT)
Dept: ANTEPARTUM | Facility: CLINIC | Age: 33
End: 2023-04-04
Payer: COMMERCIAL

## 2023-04-04 ENCOUNTER — ASOB RESULT (OUTPATIENT)
Age: 33
End: 2023-04-04

## 2023-04-04 ENCOUNTER — TRANSCRIPTION ENCOUNTER (OUTPATIENT)
Age: 33
End: 2023-04-04

## 2023-04-04 PROCEDURE — 76818 FETAL BIOPHYS PROFILE W/NST: CPT

## 2023-04-05 ENCOUNTER — TRANSCRIPTION ENCOUNTER (OUTPATIENT)
Age: 33
End: 2023-04-05

## 2023-04-05 ENCOUNTER — INPATIENT (INPATIENT)
Facility: HOSPITAL | Age: 33
LOS: 2 days | Discharge: HOME | End: 2023-04-08
Attending: STUDENT IN AN ORGANIZED HEALTH CARE EDUCATION/TRAINING PROGRAM | Admitting: STUDENT IN AN ORGANIZED HEALTH CARE EDUCATION/TRAINING PROGRAM
Payer: COMMERCIAL

## 2023-04-05 VITALS — DIASTOLIC BLOOD PRESSURE: 60 MMHG | SYSTOLIC BLOOD PRESSURE: 112 MMHG | HEART RATE: 71 BPM

## 2023-04-05 DIAGNOSIS — Z98.891 HISTORY OF UTERINE SCAR FROM PREVIOUS SURGERY: ICD-10-CM

## 2023-04-05 LAB
APTT BLD: 24 SEC — LOW (ref 27–36.3)
BASOPHILS # BLD AUTO: 0.02 K/UL — SIGNIFICANT CHANGE UP (ref 0–0.2)
BASOPHILS NFR BLD AUTO: 0.3 % — SIGNIFICANT CHANGE UP (ref 0–2)
BLD GP AB SCN SERPL QL: NEGATIVE — SIGNIFICANT CHANGE UP
COVID-19 SPIKE DOMAIN AB INTERP: POSITIVE
COVID-19 SPIKE DOMAIN ANTIBODY RESULT: >250 U/ML — HIGH
EOSINOPHIL # BLD AUTO: 0.08 K/UL — SIGNIFICANT CHANGE UP (ref 0–0.5)
EOSINOPHIL NFR BLD AUTO: 1.3 % — SIGNIFICANT CHANGE UP (ref 0–6)
FIBRINOGEN PPP-MCNC: 310 MG/DL — SIGNIFICANT CHANGE UP (ref 200–465)
HCT VFR BLD CALC: 35.9 % — SIGNIFICANT CHANGE UP (ref 34.5–45)
HCT VFR BLD CALC: 36.5 % — SIGNIFICANT CHANGE UP (ref 34.5–45)
HCT VFR BLD CALC: 37.8 % — SIGNIFICANT CHANGE UP (ref 34.5–45)
HGB BLD-MCNC: 11.4 G/DL — LOW (ref 11.5–15.5)
HGB BLD-MCNC: 12.1 G/DL — SIGNIFICANT CHANGE UP (ref 11.5–15.5)
HGB BLD-MCNC: 12.3 G/DL — SIGNIFICANT CHANGE UP (ref 11.5–15.5)
IANC: 3.76 K/UL — SIGNIFICANT CHANGE UP (ref 1.8–7.4)
IMM GRANULOCYTES NFR BLD AUTO: 0.5 % — SIGNIFICANT CHANGE UP (ref 0–0.9)
INR BLD: 1 RATIO — SIGNIFICANT CHANGE UP (ref 0.88–1.16)
LYMPHOCYTES # BLD AUTO: 1.57 K/UL — SIGNIFICANT CHANGE UP (ref 1–3.3)
LYMPHOCYTES # BLD AUTO: 25.2 % — SIGNIFICANT CHANGE UP (ref 13–44)
MCHC RBC-ENTMCNC: 29.7 PG — SIGNIFICANT CHANGE UP (ref 27–34)
MCHC RBC-ENTMCNC: 29.9 PG — SIGNIFICANT CHANGE UP (ref 27–34)
MCHC RBC-ENTMCNC: 30 PG — SIGNIFICANT CHANGE UP (ref 27–34)
MCHC RBC-ENTMCNC: 31.8 GM/DL — LOW (ref 32–36)
MCHC RBC-ENTMCNC: 32.5 GM/DL — SIGNIFICANT CHANGE UP (ref 32–36)
MCHC RBC-ENTMCNC: 33.2 GM/DL — SIGNIFICANT CHANGE UP (ref 32–36)
MCV RBC AUTO: 90.3 FL — SIGNIFICANT CHANGE UP (ref 80–100)
MCV RBC AUTO: 91.7 FL — SIGNIFICANT CHANGE UP (ref 80–100)
MCV RBC AUTO: 93.5 FL — SIGNIFICANT CHANGE UP (ref 80–100)
MONOCYTES # BLD AUTO: 0.77 K/UL — SIGNIFICANT CHANGE UP (ref 0–0.9)
MONOCYTES NFR BLD AUTO: 12.4 % — SIGNIFICANT CHANGE UP (ref 2–14)
NEUTROPHILS # BLD AUTO: 3.76 K/UL — SIGNIFICANT CHANGE UP (ref 1.8–7.4)
NEUTROPHILS NFR BLD AUTO: 60.3 % — SIGNIFICANT CHANGE UP (ref 43–77)
NRBC # BLD: 0 /100 WBCS — SIGNIFICANT CHANGE UP (ref 0–0)
NRBC # FLD: 0 K/UL — SIGNIFICANT CHANGE UP (ref 0–0)
PLATELET # BLD AUTO: 173 K/UL — SIGNIFICANT CHANGE UP (ref 150–400)
PLATELET # BLD AUTO: 174 K/UL — SIGNIFICANT CHANGE UP (ref 150–400)
PLATELET # BLD AUTO: 183 K/UL — SIGNIFICANT CHANGE UP (ref 150–400)
PROTHROM AB SERPL-ACNC: 11.6 SEC — SIGNIFICANT CHANGE UP (ref 10.5–13.4)
RBC # BLD: 3.84 M/UL — SIGNIFICANT CHANGE UP (ref 3.8–5.2)
RBC # BLD: 4.04 M/UL — SIGNIFICANT CHANGE UP (ref 3.8–5.2)
RBC # BLD: 4.12 M/UL — SIGNIFICANT CHANGE UP (ref 3.8–5.2)
RBC # FLD: 14.8 % — HIGH (ref 10.3–14.5)
RBC # FLD: 15.3 % — HIGH (ref 10.3–14.5)
RBC # FLD: 15.3 % — HIGH (ref 10.3–14.5)
RH IG SCN BLD-IMP: POSITIVE — SIGNIFICANT CHANGE UP
SARS-COV-2 IGG+IGM SERPL QL IA: >250 U/ML — HIGH
SARS-COV-2 IGG+IGM SERPL QL IA: POSITIVE
T PALLIDUM AB TITR SER: NEGATIVE — SIGNIFICANT CHANGE UP
WBC # BLD: 14.8 K/UL — HIGH (ref 3.8–10.5)
WBC # BLD: 6.23 K/UL — SIGNIFICANT CHANGE UP (ref 3.8–10.5)
WBC # BLD: 8.58 K/UL — SIGNIFICANT CHANGE UP (ref 3.8–10.5)
WBC # FLD AUTO: 14.8 K/UL — HIGH (ref 3.8–10.5)
WBC # FLD AUTO: 6.23 K/UL — SIGNIFICANT CHANGE UP (ref 3.8–10.5)
WBC # FLD AUTO: 8.58 K/UL — SIGNIFICANT CHANGE UP (ref 3.8–10.5)

## 2023-04-05 PROCEDURE — 59025 FETAL NON-STRESS TEST: CPT | Mod: 26

## 2023-04-05 PROCEDURE — 59514 CESAREAN DELIVERY ONLY: CPT | Mod: AS

## 2023-04-05 PROCEDURE — 76815 OB US LIMITED FETUS(S): CPT | Mod: 26

## 2023-04-05 DEVICE — SURGICEL SNOW 2 X 4": Type: IMPLANTABLE DEVICE | Status: FUNCTIONAL

## 2023-04-05 RX ORDER — SODIUM CHLORIDE 9 MG/ML
1000 INJECTION, SOLUTION INTRAVENOUS ONCE
Refills: 0 | Status: COMPLETED | OUTPATIENT
Start: 2023-04-05 | End: 2023-04-05

## 2023-04-05 RX ORDER — ACETAMINOPHEN 500 MG
975 TABLET ORAL
Refills: 0 | Status: DISCONTINUED | OUTPATIENT
Start: 2023-04-05 | End: 2023-04-08

## 2023-04-05 RX ORDER — SIMETHICONE 80 MG/1
80 TABLET, CHEWABLE ORAL EVERY 4 HOURS
Refills: 0 | Status: DISCONTINUED | OUTPATIENT
Start: 2023-04-05 | End: 2023-04-08

## 2023-04-05 RX ORDER — LANOLIN
1 OINTMENT (GRAM) TOPICAL EVERY 6 HOURS
Refills: 0 | Status: DISCONTINUED | OUTPATIENT
Start: 2023-04-05 | End: 2023-04-08

## 2023-04-05 RX ORDER — SODIUM CHLORIDE 9 MG/ML
1000 INJECTION, SOLUTION INTRAVENOUS
Refills: 0 | Status: DISCONTINUED | OUTPATIENT
Start: 2023-04-05 | End: 2023-04-05

## 2023-04-05 RX ORDER — NALOXONE HYDROCHLORIDE 4 MG/.1ML
0.1 SPRAY NASAL
Refills: 0 | Status: DISCONTINUED | OUTPATIENT
Start: 2023-04-05 | End: 2023-04-08

## 2023-04-05 RX ORDER — MORPHINE SULFATE 50 MG/1
0.1 CAPSULE, EXTENDED RELEASE ORAL ONCE
Refills: 0 | Status: DISCONTINUED | OUTPATIENT
Start: 2023-04-05 | End: 2023-04-08

## 2023-04-05 RX ORDER — FAMOTIDINE 10 MG/ML
20 INJECTION INTRAVENOUS ONCE
Refills: 0 | Status: COMPLETED | OUTPATIENT
Start: 2023-04-05 | End: 2023-04-05

## 2023-04-05 RX ORDER — CITRIC ACID/SODIUM CITRATE 300-500 MG
30 SOLUTION, ORAL ORAL ONCE
Refills: 0 | Status: COMPLETED | OUTPATIENT
Start: 2023-04-05 | End: 2023-04-05

## 2023-04-05 RX ORDER — SODIUM CHLORIDE 9 MG/ML
1000 INJECTION, SOLUTION INTRAVENOUS
Refills: 0 | Status: DISCONTINUED | OUTPATIENT
Start: 2023-04-05 | End: 2023-04-07

## 2023-04-05 RX ORDER — TETANUS TOXOID, REDUCED DIPHTHERIA TOXOID AND ACELLULAR PERTUSSIS VACCINE, ADSORBED 5; 2.5; 8; 8; 2.5 [IU]/.5ML; [IU]/.5ML; UG/.5ML; UG/.5ML; UG/.5ML
0.5 SUSPENSION INTRAMUSCULAR ONCE
Refills: 0 | Status: DISCONTINUED | OUTPATIENT
Start: 2023-04-05 | End: 2023-04-08

## 2023-04-05 RX ORDER — OXYTOCIN 10 UNIT/ML
333.33 VIAL (ML) INJECTION
Qty: 20 | Refills: 0 | Status: DISCONTINUED | OUTPATIENT
Start: 2023-04-05 | End: 2023-04-07

## 2023-04-05 RX ORDER — TRANEXAMIC ACID 100 MG/ML
1000 INJECTION, SOLUTION INTRAVENOUS ONCE
Refills: 0 | Status: COMPLETED | OUTPATIENT
Start: 2023-04-05 | End: 2023-04-05

## 2023-04-05 RX ORDER — DIPHENOXYLATE HCL/ATROPINE 2.5-.025MG
2 TABLET ORAL ONCE
Refills: 0 | Status: DISCONTINUED | OUTPATIENT
Start: 2023-04-05 | End: 2023-04-05

## 2023-04-05 RX ORDER — HEPARIN SODIUM 5000 [USP'U]/ML
5000 INJECTION INTRAVENOUS; SUBCUTANEOUS EVERY 12 HOURS
Refills: 0 | Status: DISCONTINUED | OUTPATIENT
Start: 2023-04-05 | End: 2023-04-08

## 2023-04-05 RX ORDER — ACETAMINOPHEN 500 MG
1000 TABLET ORAL ONCE
Refills: 0 | Status: COMPLETED | OUTPATIENT
Start: 2023-04-05 | End: 2023-04-05

## 2023-04-05 RX ORDER — CARBOPROST TROMETHAMINE 250 UG/ML
250 INJECTION, SOLUTION INTRAMUSCULAR ONCE
Refills: 0 | Status: COMPLETED | OUTPATIENT
Start: 2023-04-05 | End: 2023-04-05

## 2023-04-05 RX ORDER — DEXAMETHASONE 0.5 MG/5ML
4 ELIXIR ORAL EVERY 6 HOURS
Refills: 0 | Status: DISCONTINUED | OUTPATIENT
Start: 2023-04-05 | End: 2023-04-08

## 2023-04-05 RX ORDER — MAGNESIUM HYDROXIDE 400 MG/1
30 TABLET, CHEWABLE ORAL
Refills: 0 | Status: DISCONTINUED | OUTPATIENT
Start: 2023-04-05 | End: 2023-04-08

## 2023-04-05 RX ORDER — OXYCODONE HYDROCHLORIDE 5 MG/1
5 TABLET ORAL ONCE
Refills: 0 | Status: DISCONTINUED | OUTPATIENT
Start: 2023-04-05 | End: 2023-04-08

## 2023-04-05 RX ORDER — OXYCODONE HYDROCHLORIDE 5 MG/1
5 TABLET ORAL
Refills: 0 | Status: COMPLETED | OUTPATIENT
Start: 2023-04-05 | End: 2023-04-12

## 2023-04-05 RX ORDER — DIPHENHYDRAMINE HCL 50 MG
25 CAPSULE ORAL EVERY 6 HOURS
Refills: 0 | Status: DISCONTINUED | OUTPATIENT
Start: 2023-04-05 | End: 2023-04-08

## 2023-04-05 RX ORDER — ONDANSETRON 8 MG/1
4 TABLET, FILM COATED ORAL EVERY 6 HOURS
Refills: 0 | Status: DISCONTINUED | OUTPATIENT
Start: 2023-04-05 | End: 2023-04-08

## 2023-04-05 RX ORDER — OXYCODONE HYDROCHLORIDE 5 MG/1
5 TABLET ORAL
Refills: 0 | Status: DISCONTINUED | OUTPATIENT
Start: 2023-04-05 | End: 2023-04-08

## 2023-04-05 RX ORDER — IBUPROFEN 200 MG
600 TABLET ORAL EVERY 6 HOURS
Refills: 0 | Status: COMPLETED | OUTPATIENT
Start: 2023-04-05 | End: 2024-03-03

## 2023-04-05 RX ORDER — ONDANSETRON 8 MG/1
4 TABLET, FILM COATED ORAL ONCE
Refills: 0 | Status: COMPLETED | OUTPATIENT
Start: 2023-04-05 | End: 2023-04-05

## 2023-04-05 RX ADMIN — TRANEXAMIC ACID 220 MILLIGRAM(S): 100 INJECTION, SOLUTION INTRAVENOUS at 11:14

## 2023-04-05 RX ADMIN — Medication 0.2 MILLIGRAM(S): at 11:55

## 2023-04-05 RX ADMIN — Medication 0.2 MILLIGRAM(S): at 10:36

## 2023-04-05 RX ADMIN — CARBOPROST TROMETHAMINE 250 MICROGRAM(S): 250 INJECTION, SOLUTION INTRAMUSCULAR at 11:18

## 2023-04-05 RX ADMIN — Medication 0.2 MILLIGRAM(S): at 16:05

## 2023-04-05 RX ADMIN — SODIUM CHLORIDE 2000 MILLILITER(S): 9 INJECTION, SOLUTION INTRAVENOUS at 08:33

## 2023-04-05 RX ADMIN — Medication 1000 MILLIUNIT(S)/MIN: at 13:13

## 2023-04-05 RX ADMIN — Medication 0.2 MILLIGRAM(S): at 20:03

## 2023-04-05 RX ADMIN — ONDANSETRON 4 MILLIGRAM(S): 8 TABLET, FILM COATED ORAL at 11:40

## 2023-04-05 RX ADMIN — Medication 975 MILLIGRAM(S): at 23:52

## 2023-04-05 RX ADMIN — Medication 975 MILLIGRAM(S): at 17:08

## 2023-04-05 RX ADMIN — Medication 2 TABLET(S): at 12:25

## 2023-04-05 RX ADMIN — FAMOTIDINE 20 MILLIGRAM(S): 10 INJECTION INTRAVENOUS at 08:38

## 2023-04-05 RX ADMIN — Medication 975 MILLIGRAM(S): at 22:52

## 2023-04-05 RX ADMIN — SODIUM CHLORIDE 1000 MILLILITER(S): 9 INJECTION, SOLUTION INTRAVENOUS at 11:17

## 2023-04-05 RX ADMIN — Medication 400 MILLIGRAM(S): at 11:03

## 2023-04-05 RX ADMIN — SODIUM CHLORIDE 75 MILLILITER(S): 9 INJECTION, SOLUTION INTRAVENOUS at 13:13

## 2023-04-05 RX ADMIN — Medication 30 MILLILITER(S): at 08:33

## 2023-04-05 RX ADMIN — Medication 1000 MILLIGRAM(S): at 11:30

## 2023-04-05 RX ADMIN — HEPARIN SODIUM 5000 UNIT(S): 5000 INJECTION INTRAVENOUS; SUBCUTANEOUS at 17:10

## 2023-04-05 RX ADMIN — Medication 975 MILLIGRAM(S): at 18:00

## 2023-04-05 NOTE — OB PROVIDER H&P - NS_OBGYNHISTORY_OBGYN_ALL_OB_FT
OB History: : 2017, primary  for placental abruption, FT, Female, 3240 g, denies HTN/DM/fetal issues  G2: Current pregnancy, elevated GCT, GTT wnl, resolved low lying placenta, denies HTN/DM/Fetal issues    Prenatal Labs Reviewed:  T&S: AB+  Rubella: NonImmune  Hep B: Neg  HIV: Neg  RPR: Neg  GCT: 147  GTT: 78/146/130  G/C: Neg  GBS: Positive 3/2     Ultrasounds:  : 1st trimester dating sonogram  : Cephalic, posterior placenta, no previa, 920g  3/13: Cephalic, posterior placenta, resolved low lying placenta, EFW 2527 g *78%ile)  3/28Z: Cephalic, posterior placenta, EFW 3608g AC 98%ile    GYN Hx: Denies fibroids, ovarian cysts, HSV/ STDs, abnormal pap smears

## 2023-04-05 NOTE — OB PROVIDER H&P - NSLOWPPHRISK_OBGYN_A_OB
Smith Pregnancy/Less than or equal to 4 previous vaginal births/No known bleeding disorder/No history of postpartum hemorrhage

## 2023-04-05 NOTE — DISCHARGE NOTE OB - MEDICATION SUMMARY - MEDICATIONS TO TAKE
I will START or STAY ON the medications listed below when I get home from the hospital:    iron  -- one tab daily  -- Indication: For home    prenatal multivitamins  -- 2 tabs daily  -- Indication: For home    acetaminophen 325 mg oral tablet  -- 3 tab(s) by mouth every 8 hours  -- Indication: For pain    ibuprofen 600 mg oral tablet  -- 1 tab(s) by mouth every 6 hours  -- Indication: For pain    lanolin topical ointment  -- 1 Apply on skin to affected area every 6 hours As needed Sore Nipples  -- Indication: For nipple soreness

## 2023-04-05 NOTE — OB PROVIDER DELIVERY SUMMARY - NSSELHIDDEN_OBGYN_ALL_OB_FT
[[email or web address]_OBGYN_ALL_OB_FT:[id number]],[NS_DeliveryAssist1_OBGYN_ALL_OB_FT:[id number]],[NS_DeliveryRN_OBGYN_ALL_OB_FT:[id number]]

## 2023-04-05 NOTE — OB PROVIDER H&P - ASSESSMENT
32 year old  @ 39.0 weeks, admitted to L&D for scheduled repeat  for history of prior , feeling irregular contractions with SVE closed/long/posterior, Reactive NST, vital signs stable  - Admit to L&D  - NPO  - IV access, CBC/T&C/RPR  - Pepcid and Bicitra as per pre-op policy  - Irregular contractions: SVE still closed, will proceed with , pt in agreement  - Anesthesia consult, discussed with anesthesia the history of 2 loose teeth with dentist note in chart  - Consent to be discussed and obtained by Dr. Stafford  - Plan to move to OR on time schedule  - Educated and discussed with patient the assessment and plan, pt verbalized understanding and agreement with assessment and plan, all questions answered  - Discussed with Dr. Stafford

## 2023-04-05 NOTE — OB RN PATIENT PROFILE - FUNCTIONAL ASSESSMENT - DAILY ACTIVITY PT AGE POP HIDDEN
Discharge Note -Hand Therapy    Initial Evaluation Date:  4/24/2017  Current Date: 9/15/2017  Number of Visits: 9    S:    Pt did not follow up for scheduled visits.    O:  Objective information is not available as pt has not returned for therapy.  Last visit or last objective information will serve as final entry.      A: Pt did not return for further treatment.    Status of goals is unknown due to lack of followup of patient.      P:  Discharge from Hand Therapy.       Adult

## 2023-04-05 NOTE — OB PROVIDER H&P - NSICDXPASTMEDICALHX_GEN_ALL_CORE_FT
PAST MEDICAL HISTORY:  No pertinent past medical history      PAST MEDICAL HISTORY:  Loose, teeth

## 2023-04-05 NOTE — OB PROVIDER H&P - ALERT: PERTINENT HISTORY
1st Trimester Sonogram/Fetal Non-Stress Test (NST)/Ultra Screen at 12 Weeks 1st Trimester Sonogram/20 Week Level II Sonogram/BioPhysical Profile(s)/Non Invasive Prenatal Screen (NIPS)/Fetal Non-Stress Test (NST)/Ultra Screen at 12 Weeks

## 2023-04-05 NOTE — OB PROVIDER H&P - HISTORY OF PRESENT ILLNESS
32 year old  @ 39.0 weeks, RAMIREZ 2023 dated by LMP (22) consistent with 1st Trimester US, presents to L&D for scheduled repeat  secondary to history of prior . NPO TIME 10 pm last night. Patient admits to normal fetal movement. Denies vaginal bleeding, leakage of fluid, painful contractions/lower abdominal cramping, fever/chills, shortness of breath,  chest pain, increased swelling, difficulty ambulating, loss of taste/smell, nausea/vomiting/diarrhea, rash, weakness, paresthesia, change in appetite, dizziness, lightheadedness, cough, nasal congestion, runny nose    Antepartum Course:  1. Elevated GCT, GTT wnl  2. Resolved low lying placenta  3. GBS Positive 3/  4. History of prior      OB History: : 2017, primary  for placental abruption, FT, Female, 3240 g, denies HTN/DM/fetal issues  G2: Current pregnancy, elevated GCT, GTT wnl, resolved low lying placenta    GYN Hx: .gynhx    Med Hx: Denies asthma, HTN, DM, CAD, or other medical issues    Meds: PNV, denies other medications including prescribed/OTC     Allergies: NKDA, NKEA, NKFA    Surgical Hx: **    Social: Denies cigarette/tobacco/alcohol/illicit drug use    Psych: Denies anxiety/depression/other mental health disease    Physical Exam:  Vitals:  Gen: NAD, A+O x 3, resting comfortably  Resp: CTAB  Cardio: RRR  Abd: Gravid, soft, non-distended, non-tender to superficial and deep palpation in all 4 quadrants, no rebound/guarding  Ext: Warm, well perfused, 1+ nonpitting edema bilaterally    EFM: **  [place]: **    Bedside Transabdominal US: *  32 year old  @ 39.0 weeks, RAMIREZ 2023 dated by LMP (22) consistent with 1st Trimester US, presents to L&D for scheduled repeat  secondary to history of prior . NPO TIME 10 pm last night. Patient admits to normal fetal movement. Patient also admits to intermittent lower abdominal cramping, every 20 minutes. Denies vaginal bleeding, leakage of fluid, painful contractions, fever/chills, shortness of breath,  chest pain, increased swelling, difficulty ambulating, loss of taste/smell, nausea/vomiting/diarrhea, rash, weakness, paresthesia, change in appetite, dizziness, lightheadedness, cough, nasal congestion, runny nose    Antepartum Course:  1. Elevated GCT, GTT wnl  2. Resolved low lying placenta  3. GBS Positive 3/  4. History of prior      OB History: : 2017, primary  for placental abruption, FT, Female, 3240 g, denies HTN/DM/fetal issues  G2: Current pregnancy, elevated GCT, GTT wnl, resolved low lying placenta, denies HTN/DM/Fetal issues    Prenatal Labs Reviewed:  T&S: AB+  Rubella: NonImmune  Hep B: Neg  HIV: Neg  RPR: Neg  GCT: 147  GTT: 78/146/130  G/C: Neg  GBS: Positive 3/2     Ultrasounds:  : 1st trimester dating sonogram  : Cephalic, posterior placenta, no previa, 920g  3/13: Cephalic, posterior placenta, resolved low lying placenta, EFW 2527 g *78%ile)  3/28Z: Cephalic, posterior placenta, EFW 3608g AC 98%ile    GYN Hx: Denies fibroids, ovarian cysts, HSV/ STDs, abnormal pap smears     Allergy: PCN - Hives/Rash (childhood rash as per mother) states unsure if taken Cephalozlins / PCN since        Social: Denies cigarette/tobacco/alcohol/illicit drug use    Psych: Denies anxiety/depression, pp depression, other mental health disease    Physical Exam:  Vitals:  Gen: NAD, A+O x 3, resting comfortably  Resp: CTAB  Cardio: RRR  Abd: Gravid, soft, non-distended, non-tender to superficial and deep palpation in all 4 quadrants, no rebound/guarding  Ext: Warm, well perfused, 1+ nonpitting edema bilaterally    EFM: **  [place]: **    Bedside Transabdominal US: *  32 year old  @ 39.0 weeks, RAMIREZ 2023 dated by LMP (22) consistent with 1st Trimester US, presents to L&D for scheduled repeat  secondary to history of prior . NPO TIME 10 pm last night. Patient admits to normal fetal movement. Patient also admits to intermittent lower abdominal cramping, every 20 minutes. Denies vaginal bleeding, leakage of fluid, painful contractions, fever/chills, shortness of breath,  chest pain, increased swelling, difficulty ambulating, loss of taste/smell, nausea/vomiting/diarrhea, rash, weakness, paresthesia, change in appetite, dizziness, lightheadedness, cough, nasal congestion, runny nose    Antepartum Course:  1. Elevated GCT, GTT wnl  2. Resolved low lying placenta  3. GBS Positive 3/  4. History of prior        Allergy: PCN - Hives/Rash (childhood rash as per mother) states unsure if taken Cephazolin / PCN since

## 2023-04-05 NOTE — OB PROVIDER H&P - NSHPSOCIALHISTORY_GEN_ALL_CORE
Social: Denies cigarette/tobacco/alcohol/illicit drug use    Psych: Denies anxiety/depression, pp depression, other mental health disease

## 2023-04-05 NOTE — OB RN DELIVERY SUMMARY - AMNIOTIC FLUID AMOUNT, LABOR
nitroGLYCERIN (NITROSTAT) 0.4 MG SL tablet, up to max of 3 total doses. If no relief after 1 dose, call 911., Disp: 25 tablet, Rfl: 3    metFORMIN (GLUCOPHAGE) 500 MG tablet, TAKE ONE TABLET BY MOUTH ONCE DAILY WITH BREAKFAST, Disp: 90 tablet, Rfl: 1    ticagrelor (BRILINTA) 90 MG TABS tablet, Take 1 tablet by mouth 2 times daily, Disp: 60 tablet, Rfl: 1    metoprolol tartrate (LOPRESSOR) 50 MG tablet, Take 1 tablet by mouth 2 times daily, Disp: 60 tablet, Rfl: 1    losartan (COZAAR) 25 MG tablet, Take 1 tablet by mouth daily, Disp: 30 tablet, Rfl: 1    atorvastatin (LIPITOR) 40 MG tablet, Take 1 tablet by mouth nightly, Disp: 30 tablet, Rfl: 1    sertraline (ZOLOFT) 50 MG tablet, TAKE ONE TABLET BY MOUTH DAILY, Disp: 90 tablet, Rfl: 5    buPROPion (ZYBAN) 150 MG extended release tablet, TAKE ONE TABLET BY MOUTH TWICE DAILY, Disp: 180 tablet, Rfl: 1    allopurinol (ZYLOPRIM) 100 MG tablet, Take 1 tablet by mouth daily, Disp: 90 tablet, Rfl: 1    Potassium Gluconate 595 (99 K) MG TABS, Take by mouth, Disp: , Rfl:     Cholecalciferol (VITAMIN D) 50 MCG (2000 UT) CAPS capsule, One po qd, Disp: 90 capsule, Rfl: 1    Ascorbic Acid (VITAMIN C) 250 MG tablet, Take 250 mg by mouth daily, Disp: , Rfl:      has a past medical history of Abdominal hernia, Alcohol abuse, Anxiety, CAD S/P percutaneous coronary angioplasty, Clostridium difficile diarrhea, Clostridium difficile diarrhea, DVT (deep venous thrombosis) (HCC), DVT of axillary vein, acute left (HCC), Gout, H/O ulcer disease, Hernia, History of blood transfusion, Hyperlipemia, Hypertension, Hypertension, essential, Kidney congenitally absent, left, Kidney disease, Lightheaded, Obesity, Obstructive sleep apnea (adult) (pediatric), Renal agenesis, and Severe single current episode of major depressive disorder, without psychotic features ([facility] [address]     Past Surgical History:   Procedure Laterality Date    HERNIA REPAIR      2011    OTHER SURGICAL HISTORY  4/20/2015 EXCISIONAL DEBRIDEMENT AND IRRIGATION OF ABDOMINAL WOUND    TONSILLECTOMY AND ADENOIDECTOMY      VENTRAL HERNIA REPAIR  3/23/15    with mesh    VENTRAL HERNIA REPAIR  6/2/16        reports that he quit smoking about 4 years ago. He has a 2.00 pack-year smoking history. He has never used smokeless tobacco. He reports that he does not drink alcohol and does not use drugs. family history includes Cancer in his father; Coronary Art Dis (age of onset: 36) in his mother; High Blood Pressure in his mother and sister; Hypertension in his mother and sister; Other in his mother and sister; Stroke (age of onset: 27) in his mother. Objective:  Blood pressure 124/86, pulse 87, temperature 97.5 °F (36.4 °C), temperature source Tympanic, resp. rate 16, height 5' 7\" (1.702 m), weight (!) 397 lb 8 oz (180.3 kg), SpO2 98 %. Physical Exam  Vitals and nursing note reviewed. Constitutional:       General: He is not in acute distress. Appearance: He is well-developed. He is obese. HENT:      Head: Normocephalic. Mouth/Throat:      Pharynx: No oropharyngeal exudate. Eyes:      General: No scleral icterus. Conjunctiva/sclera: Conjunctivae normal.      Pupils: Pupils are equal, round, and reactive to light. Neck:      Vascular: No carotid bruit. Comments: No carotid bruits    Cardiovascular:      Rate and Rhythm: Normal rate and regular rhythm. Pulses: Normal pulses. Heart sounds: Normal heart sounds. No murmur heard. No friction rub. Comments: No edema    Pulmonary:      Effort: Pulmonary effort is normal. No respiratory distress. Breath sounds: Normal breath sounds. No stridor. No wheezing, rhonchi or rales. Chest:      Chest wall: No tenderness. Musculoskeletal:      Cervical back: Normal range of motion and neck supple. Lymphadenopathy:      Cervical: No cervical adenopathy. Skin:     General: Skin is warm. Findings: No erythema or rash.    Neurological: General: No focal deficit present. Mental Status: He is alert and oriented to person, place, and time. Mental status is at baseline. Cranial Nerves: No cranial nerve deficit. Coordination: Coordination normal.   Psychiatric:         Mood and Affect: Mood normal.         Behavior: Behavior normal.         Thought Content: Thought content normal.          Echo 7/15/21   Conclusions      Summary   Normal left ventricular size. Mild concentric left ventricular hypertrophy. Preserved ejection fraction estimated at 55%. Cannot exclude regional wall motion abnormalities secondary to poor   endocardial visualization. Normal diastolic function. Assessment:       Diagnosis Orders   1. Type 2 diabetes mellitus without complication, without long-term current use of insulin ([facility] [address]     2. Depression with anxiety     3. Ventral hernia without obstruction or gangrene       4. CHARLIE  5. CAD    4. Adm encounter. Plan:      1. Stable, last a1c 6.6  encourage healthy diet and if possible start walking 30 to 40 min /day  patient agrees and verbalizes understanding    Fu 3 mo. 2. Stable  Continue same medications no changes needed at this time     3. Stable  fmla    4. Improved  Continue c pap     5.  Stable  Echo is great, continue medical treatment  encourage wt loss  Encourage compliance with medication, life style changes  Continue to fu with cardiology     6. Gregory Baltazar MD within normal limits

## 2023-04-05 NOTE — BRIEF OPERATIVE NOTE - OPERATION/FINDINGS
Viable female infant, apgar , wgt 3240gms  delivered @ 9:34 am  Fascia adhesions noted   Thin COREEN noted  cephalic presentation,  clear copious fluid  hysterotomy closed in single layer   Surgicel powder placed over hysterotomy  otherwise grossly nl uterus, tubes & ovaries    Upon post-op expression, increased vaginal bleeding with blood clots on bimanual s/p Methergine IM, Cytotec DC with firm fundus, resolution of bleeding transitioning to PACU     QBL: 963 cc  UOP: 1000 cc  IVF: 2000 cc  Dictation Number: [id number]

## 2023-04-05 NOTE — OB RN INTRAOPERATIVE NOTE - NSSELHIDDEN_OBGYN_ALL_OB_FT
[NS_DeliveryAttending1_OBGYN_ALL_OB_FT:[email or web address]=],[NS_DeliveryAssist1_OBGYN_ALL_OB_FT:[id number]=],[NS_DeliveryRN_OBGYN_ALL_OB_FT:[id number]]

## 2023-04-05 NOTE — OB PROVIDER H&P - NS_SCHEDCS_OBGYN_ALL_OB
Prior  Detail Level: Detailed Duration Of Freeze Thaw-Cycle (Seconds): 3 Post-Care Instructions: I reviewed with the patient in detail post-care instructions. Patient is to wear sunprotection, and avoid picking at any of the treated lesions. Pt may apply Vaseline to crusted or scabbing areas. Number Of Freeze-Thaw Cycles: 1 freeze-thaw cycle Consent: The patient's consent was obtained including but not limited to risks of crusting, scabbing, blistering, scarring, darker or lighter pigmentary change, recurrence, incomplete removal and infection. Render Post-Care Instructions In Note?: yes

## 2023-04-05 NOTE — CHART NOTE - NSCHARTNOTEFT_GEN_A_CORE
PATTIE PLACEMENT    Time of Insertion:	11:57 AM     Time of Removal:	      Duration of Device in Uterus:	       Amount of Saline Used to Fill Balloon:	 80 CC    Amount of Blood in Canister (After Attaching to Suction):	        Uterotonics used?	?	YES                                   	If YES, which?          Dosage               Methergine  0.2 mg IM  DOSE # 2 administered      Need for transfusion?	?	YES                                 NO    	If YES, how many units?    ______________    Dr. Stafford at bedside PATTIE PLACEMENT    Time of Insertion:	11:57 AM     Time of Removal:	2:56 PM      Duration of Device in Uterus:	   3 hours    Amount of Saline Used to Fill Balloon:	 80 CC    Amount of Blood in Canister (After Attaching to Suction):	100 cc total (including tubing)        Uterotonics used?	?	YES                                   	If YES, which?          Dosage               Methergine  0.2 mg IM  DOSE # 2 administered      Need for transfusion?	?	YES                                 NO    	If YES, how many units?    ______________    Dr. Stafford at bedside

## 2023-04-05 NOTE — OB RN INTRAOPERATIVE NOTE - NS_ADDITIONALPROCINFO1_OBGYN_ALL_OB_FT
Post surgery pt with heavy vaginal bleeding and clots. VE by ARNULFO Neal. Cytotec 800micrograms HI and methergine 0.2mg IM administered.

## 2023-04-05 NOTE — OB RN PATIENT PROFILE - FUNCTIONAL ASSESSMENT - BASIC MOBILITY 4.
56 y/o male with PMHx of HIV on HAART, rectal/prostate cancer s/p radiation with perforation of recum/anus s/p abdominoperitoneal resection with end colostomy, and recently placed drain for thigh collection p/w hematuria found to have bilateral moderate hydronephrosis s/p NT placement. Patient found to have hematuria with positive UA, treated with CTX for 4 days. UCx negative. US of the kidneys showed bilateral moderate hydronephrosis. Underwent bilateral nephrostomy tube placements with IR on 11/17. No complications. Underwent CT A/P after procedure -     incomplete *** 58 y/o male with PMHx of HIV on HAART, rectal/prostate cancer s/p radiation with perforation of recum/anus s/p abdominoperitoneal resection with end colostomy, and recently placed drain for thigh collection p/w hematuria found to have bilateral moderate hydronephrosis s/p NT placement. Patient found to have hematuria with positive UA, treated with CTX for 4 days. UCx negative. US of the kidneys showed bilateral moderate hydronephrosis. Underwent bilateral nephrostomy tube placements with IR on 11/17. No complications. Underwent CT A/P after procedure -  CT scan showed a 1-2 mm stone in your right kidney/ ureter. Urology stated will likely pass on it's own, and started pt on flomax trial. Will f/u urology outpatient     incomplete *** 56 y/o male with PMHx of HIV on HAART, rectal/prostate cancer s/p radiation with perforation of recum/anus s/p abdominoperitoneal resection with end colostomy, and recently placed drain for thigh collection p/w hematuria found to have bilateral moderate hydronephrosis s/p NT placement. Patient found to have hematuria with positive UA, treated with CTX for 4 days. UCx negative. US of the kidneys showed bilateral moderate hydronephrosis. Underwent bilateral nephrostomy tube placements with IR on 11/17. No complications. Underwent CT A/P after procedure -  CT scan showed a 1-2 mm stone in your right kidney/ ureter. Urology stated will likely pass on it's own, and started pt on flomax trial. Will f/u urology outpatient     Patient also has h/o rectal/prostate cancer s/p radiation with perforation of recum/anus s/p abdominoperitoneal resection with end colostomy, and recently placed drain for thigh collection; follows with colorectal surgery. CTAP with no remaining fluid collectio, left thigh/pelvis drain with no output - reviewed by IR - removed drain 11/17.    incomplete *** 56 y/o male with PMHx of HIV on HAART, rectal/prostate cancer s/p radiation with perforation of recum/anus s/p abdominoperitoneal resection with end colostomy, and recently placed drain for thigh collection p/w hematuria found to have bilateral moderate hydronephrosis s/p NT placement. Patient found to have hematuria with positive UA, treated with CTX for 4 days. UCx negative. US of the kidneys showed bilateral moderate hydronephrosis. Underwent bilateral nephrostomy tube placements with IR on 11/17. No complications. Underwent CT A/P after procedure -  CT scan showed a 1-2 mm stone in your right kidney/ ureter. Urology stated will likely pass on it's own, and started pt on flomax trial. Will f/u urology outpatient     Patient also has h/o rectal/prostate cancer s/p radiation with perforation of recum/anus s/p abdominoperitoneal resection with end colostomy, and recently placed drain for thigh collection; follows with colorectal surgery. CTAP with no remaining fluid collection, left thigh/pelvis drain with no output - reviewed by IR - removed drain 11/17.    Patient is medically cleared for discharge to home with home care as discussed with Dr. Cooper on 11/20/22  Reviewed discharge medications with patient; All new medications requiring new prescription sent to pharmacy of patients choice. Reviewed need for prescription for previous home medicaitons and new prescriptions sent if requested. Patient in agreement and understands. 58 y/o male with PMHx of HIV on HAART, rectal/prostate cancer s/p radiation with perforation of recum/anus s/p abdominoperitoneal resection with end colostomy, and recently placed drain for thigh collection p/w hematuria found to have bilateral moderate hydronephrosis s/p NT placement. Patient found to have hematuria with positive UA, treated with CTX for 4 days. UCx negative. US of the kidneys showed bilateral moderate hydronephrosis. Underwent bilateral nephrostomy tube placements with IR on 11/17. No complications. Underwent CT A/P after procedure -  CT scan showed a 1-2 mm stone in your right kidney/ ureter. Urology stated will likely pass on it's own, and started pt on flomax trial. Will f/u urology outpatient     Patient also has h/o rectal/prostate cancer s/p radiation with perforation of recum/anus s/p abdominoperitoneal resection with end colostomy, and recently placed drain for thigh collection; follows with colorectal surgery. CTAP with no remaining fluid collection, left thigh/pelvis drain with no output - reviewed by IR - removed drain 11/17.    Patient had consistently elevated HR during admission-- metoprolol dose increased.     Patient is medically cleared for discharge to home with home care as discussed with Dr. Cooper on 11/20/22  Reviewed discharge medications with patient; All new medications requiring new prescription sent to pharmacy of patients choice. Reviewed need for prescription for previous home medicaitons and new prescriptions sent if requested. Patient in agreement and understands. 56 y/o male with PMHx of HIV on HAART, rectal/prostate cancer s/p radiation with perforation of recum/anus s/p abdominoperitoneal resection with end colostomy, and recently placed drain for thigh collection p/w hematuria found to have bilateral moderate hydronephrosis s/p NT placement. Patient found to have hematuria with positive UA, treated with CTX for 4 days. UCx negative. US of the kidneys showed bilateral moderate hydronephrosis. Underwent bilateral nephrostomy tube placements with IR on 11/17. No complications. Underwent CT A/P after procedure -  CT scan showed a 1-2 mm stone in your right kidney/ ureter. Urology stated will likely pass on it's own, and started pt on flomax trial. Will f/u urology outpatient     Patient also has h/o rectal/prostate cancer s/p radiation with perforation of recum/anus s/p abdominoperitoneal resection with end colostomy, and recently placed drain for thigh collection; follows with colorectal surgery. CTAP with no remaining fluid collection, left thigh/pelvis drain with no output - reviewed by IR - removed drain 11/17.    Patient had consistently elevated HR during admission-- metoprolol dose increased.     Patient is medically cleared for discharge to home with home care as discussed with . _____ on 11/21/22  Reviewed discharge medications with patient; All new medications requiring new prescription sent to pharmacy of patients choice. Reviewed need for prescription for previous home medicaitons and new prescriptions sent if requested. Patient in agreement and understands. 58 y/o male with PMHx of HIV on HAART, rectal/prostate cancer s/p radiation with perforation of recum/anus s/p abdominoperitoneal resection with end colostomy, and recently placed drain for thigh collection p/w hematuria found to have bilateral moderate hydronephrosis s/p NT placement. Patient found to have hematuria with positive UA, treated with CTX for 4 days. UCx negative. US of the kidneys showed bilateral moderate hydronephrosis. Underwent bilateral nephrostomy tube placements with IR on 11/17. No complications. Underwent CT A/P after procedure -  CT scan showed a 1-2 mm stone in your right kidney/ ureter. Urology stated will likely pass on it's own, and started pt on flomax trial. Will f/u urology outpatient     Patient also has h/o rectal/prostate cancer s/p radiation with perforation of recum/anus s/p abdominoperitoneal resection with end colostomy, and recently placed drain for thigh collection; follows with colorectal surgery. CTAP with no remaining fluid collection, left thigh/pelvis drain with no output - reviewed by IR - removed drain 11/17.    Patient had consistently elevated HR during admission-- Patient has sinus tachy history. Metoprolol dose initially increased, but HR not affected. Will discharge on home dose.     Patient is medically cleared for discharge to home with home care as discussed with Dr. Amato on 11/21/22  Reviewed discharge medications with patient; All new medications requiring new prescription sent to pharmacy of patients choice. Reviewed need for prescription for previous home medicaitons and new prescriptions sent if requested. Patient in agreement and understands. 56 y/o male with PMHx of HIV on HAART, rectal/prostate cancer s/p radiation with perforation of recum/anus s/p abdominoperitoneal resection with end colostomy, and recently placed drain for thigh collection p/w hematuria found to have bilateral moderate hydronephrosis s/p NT placement. Patient found to have hematuria with positive UA, treated with CTX for 4 days. UCx negative. US of the kidneys showed bilateral moderate hydronephrosis. Underwent bilateral nephrostomy tube placements with IR on 11/17. No complications. Underwent CT A/P after procedure -  CT scan showed a 1-2 mm stone in your right kidney/ ureter. Urology stated will likely pass on it's own, and started pt on flomax trial. Will f/u urology outpatient. ASAH 81 mg held for procedure and 2/2 hematuria     Patient also has h/o rectal/prostate cancer s/p radiation with perforation of recum/anus s/p abdominoperitoneal resection with end colostomy, and recently placed drain for thigh collection; follows with colorectal surgery. CTAP with no remaining fluid collection, left thigh/pelvis drain with no output - reviewed by IR - removed drain 11/17.    Patient had consistently elevated HR during admission-- Patient has sinus tachy history. Metoprolol dose initially increased, but HR not affected. Will discharge on home dose.     Patient is medically cleared for discharge to home with home care as discussed with Dr. Amato on 11/21/22  Reviewed discharge medications with patient; All new medications requiring new prescription sent to pharmacy of patients choice. Reviewed need for prescription for previous home medicaitons and new prescriptions sent if requested. Patient in agreement and understands. 56 y/o male with PMHx of HIV on HAART, rectal/prostate cancer s/p radiation with perforation of recum/anus s/p abdominoperitoneal resection with end colostomy, and recently placed drain for thigh collection p/w hematuria found to have bilateral moderate hydronephrosis s/p NT placement. Patient found to have hematuria with positive UA, treated with CTX for 4 days. UCx negative. US of the kidneys showed bilateral moderate hydronephrosis. Underwent bilateral nephrostomy tube placements with IR on 11/17. No complications. Underwent CT A/P after procedure -  CT scan showed a 1-2 mm stone in your right kidney/ ureter. Urology stated will likely pass on it's own, and started pt on flomax trial. Will f/u urology outpatient. ASAH 81 mg held for procedure and 2/2 hematuria     Patient also has h/o rectal/prostate cancer s/p radiation with perforation of recum/anus s/p abdominoperitoneal resection with end colostomy, and recently placed drain for thigh collection; follows with colorectal surgery. CTAP with no remaining fluid collection, left thigh/pelvis drain with no output - reviewed by IR - removed drain 11/17.    Patient had consistently elevated HR during admission-- Patient has sinus tachy history. Metoprolol dose initially increased, but HR not affected. Will discharge on home dose.     Patient is medically cleared for discharge to home with home care as discussed with Dr. Amato on 11/21/22  Reviewed discharge medications with patient; All new medications requiring new prescription sent to pharmacy of patients choice. Reviewed need for prescription for previous home medicaitons and new prescriptions sent if requested. Patient in agreement and understands.. 4 = No assist / stand by assistance

## 2023-04-05 NOTE — OB POSTPARTUM EVENT NOTE - NS_EVENTFINDINGS1_OBGYN_ALL_OB_FT
VE, TXA 1g IVPB, Hemabate 250 micrograms IM, Start #2 PIV, x-match 2 units PC's, IVF RL 1L bolus, CBC, coags, close observation. Dr. Stafford and Velma ARREDONDO at bedside. VE, TXA 1g IVPB, Hemabate 250 micrograms IM, Start #2 PIV, x-match 2 units PC's, IVF RL 1L bolus, CBC, coags, close observation. Dr. Stafford and Velma ARREDONDO at bedside. QBL 167ml

## 2023-04-05 NOTE — OB RN DELIVERY SUMMARY - NSSELHIDDEN_OBGYN_ALL_OB_FT
[NS_DeliveryAttending1_OBGYN_ALL_OB_FT:[email or web address]],[NS_DeliveryAssist1_OBGYN_ALL_OB_FT:[id number]],[NS_DeliveryRN_OBGYN_ALL_OB_FT:[id number]]

## 2023-04-05 NOTE — OB RN DELIVERY SUMMARY - NS_SEROLOGYDONE_OBGYN_ALL_OB
Medicare Wellness Visit, Female     The best way to live healthy is to have a lifestyle where you eat a well-balanced diet, exercise regularly, limit alcohol use, and quit all forms of tobacco/nicotine, if applicable. Regular preventive services are another way to keep healthy. Preventive services (vaccines, screening tests, monitoring & exams) can help personalize your care plan, which helps you manage your own care. Screening tests can find health problems at the earliest stages, when they are easiest to treat. [facility] follows the current, evidence-based guidelines published by the [facility] P[facility] ([facility]) when recommending preventive services for our patients. Because we follow these guidelines, sometimes recommendations change over time as research supports it. (For example, mammograms used to be recommended annually. Even though Medicare will still pay for an annual mammogram, the newer guidelines recommend a mammogram every two years for women of average risk). Of course, you and your doctor may decide to screen more often for some diseases, based on your risk and your co-morbidities (chronic disease you are already diagnosed with). Preventive services for you include:  - Medicare offers their members a free annual wellness visit, which is time for you and your primary care provider to discuss and plan for your preventive service needs. Take advantage of this benefit every year!  -All adults over the age of 72 should receive the recommended pneumonia vaccines. Current USPSTF guidelines recommend a series of two vaccines for the best pneumonia protection.   -All adults should have a flu vaccine yearly and a tetanus vaccine every 10 years.   -All adults age 48 and older should receive the shingles vaccines (series of two vaccines).       -All adults age 38-68 who are overweight should have a diabetes screening test once every three years.   -All adults born between [birth year] and 1964 should be screened once for Hepatitis C.  -Other screening tests and preventive services for persons with diabetes include: an eye exam to screen for diabetic retinopathy, a kidney function test, a foot exam, and stricter control over your cholesterol.   -Cardiovascular screening for adults with routine risk involves an electrocardiogram (ECG) at intervals determined by your doctor.   -Colorectal cancer screenings should be done for adults age 54-65 with no increased risk factors for colorectal cancer. There are a number of acceptable methods of screening for this type of cancer. Each test has its own benefits and drawbacks. Discuss with your doctor what is most appropriate for you during your annual wellness visit. The different tests include: colonoscopy (considered the best screening method), a fecal occult blood test, a fecal DNA test, and sigmoidoscopy.    -A bone mass density test is recommended when a woman turns 65 to screen for osteoporosis. This test is only recommended one time, as a screening. Some providers will use this same test as a disease monitoring tool if you already have osteoporosis. -Breast cancer screenings are recommended every other year for women of normal risk, age 54-69.  -Cervical cancer screenings for women over age 72 are only recommended with certain risk factors.      Here is a list of your current Health Maintenance items (your personalized list of preventive services) with a due date:  Health Maintenance Due   Topic Date Due    Hepatitis C Test  1950    DTaP/Tdap/Td  (1 - Tdap) 03/20/1972    Shingles Vaccine (1 of 2) 03/20/2001    Colon Cancer Stool Test  03/20/2001    Mammogram  12/05/2013    Glaucoma Screening   03/20/2016    Bone Mineral Density   03/20/2016    Pneumococcal Vaccine (1 of 1 - PPSV23) 03/20/2016    Annual Well Visit  03/25/2020    Cholesterol Test   03/25/2020 Yes

## 2023-04-05 NOTE — OB PROVIDER DELIVERY SUMMARY - NSPROVIDERDELIVERYNOTE_OBGYN_ALL_OB_FT
Viable female infant, apgar , wgt 3240gms  delivered @ 9:34 am  Fascia adhesions noted   Thin COREEN noted  cephalic presentation,  clear copious fluid  hysterotomy closed in single layer   Surgicel powder placed over hysterotomy  otherwise grossly nl uterus, tubes & ovaries    Upon post-op expression, increased vaginal bleeding with blood clots on bimanual s/p Methergine IM, Cytotec AL with firm fundus, resolution of bleeding transitioning to PACU     QBL: 963 cc  UOP: 1000 cc  IVF: 2000 cc  Dictation Number: [id number]  23 @ 11:32 Viable female infant, apgar , wgt 3240gms  delivered @ 9:34 am  Fascia adhesions noted   Thin COREEN noted  cephalic presentation,  clear copious fluid  hysterotomy closed in single layer   Surgicel powder placed over hysterotomy  otherwise grossly nl uterus, tubes & ovaries    Upon post-op expression, increased vaginal bleeding with blood clots on bimanual s/p Methergine IM, Cytotec AZ with firm fundus, resolution of bleeding transitioning to PACU     QBL: 963 cc  UOP: 1000 cc  IVF: 2000 cc  Dictation Number: [id number]  23 @ 11:32    (Due to technical difficulties, re-dictated case on  with number [id number])

## 2023-04-05 NOTE — OB PROVIDER H&P - NSHPPHYSICALEXAM_GEN_ALL_CORE
Physical Exam:  Vitals: ICU Vital Signs Last 24 Hrs  T(C): 36.9 (05 Apr 2023 07:23), Max: 36.9 (05 Apr 2023 07:02)  T(F): 98.4 (05 Apr 2023 07:23), Max: 98.42 (05 Apr 2023 07:02)  HR: 71 (05 Apr 2023 07:23) (71 - 71)  BP: 112/60 (05 Apr 2023 07:23) (112/60 - 112/60)  BP(mean): --  ABP: --  ABP(mean): --  RR: 16 (05 Apr 2023 07:23) (16 - 16)  SpO2: --      Gen: NAD, A+O x 3, resting comfortably  Resp: CTAB  Cardio: RRR  Abd: Gravid, soft, non-distended, non-tender to superficial and deep palpation in all 4 quadrants, no rebound/guarding  Ext: Warm, well perfused, 1+ nonpitting edema bilaterally    EFM: 140 bpm, moderate variability with spontaneous accelerations, no decelerations  [place]: Irregular contactions

## 2023-04-05 NOTE — DISCHARGE NOTE OB - PATIENT PORTAL LINK FT
You can access the FollowMyHealth Patient Portal offered by [facility] by registering at the following website: [email or web address]. By joining [facility]’s FollowMyHealth portal, you will also be able to view your health information using other applications (apps) compatible with our system.

## 2023-04-05 NOTE — DISCHARGE NOTE OB - CARE PROVIDER_API CALL
Chloe Stafford)  Obstetrics and Gynecology  [address] [place] [address]  Phone: [phone]  Fax: [phone]  Follow Up Time: 2 weeks

## 2023-04-05 NOTE — OB POSTPARTUM EVENT NOTE - NS_EVENTSUMMARY1_OBGYN_ALL_OB_FT
Pt with heavy vaginal bleeding. Uterus firm and at the umbilicus. Pt hypotensive-BP 80/59, HR 66, RR 20, O2sat 99%, shock index 0.82. Pt s/p scheduled repeat c/s in PACU. Delivered at 0934. Intra-op . Pt with 1 episode of heavy vaginal bleeding and clots post c/s in OR treated with cytotec IN and methergine IM.

## 2023-04-05 NOTE — CHART NOTE - NSCHARTNOTEFT_GEN_A_CORE
Physician was called to bedside for post partum bleeding. Clots evacuated form lower uterine segment  about 200cc evacuated. Decision was made to give TXA x 1, hemabate x 1, start Methergine series and give 1 L Iv bolus, cbc/ coags ordered.  Physician was called to bedside again for second bleed, again clots evacuated from lower uterine segment.   Alisha system placed, 80cc inserted , placed on suction.   total post partum hemorrhage- 330cc   total ebl 963cc + 330cc = 1293cc   will continue to monitor.

## 2023-04-05 NOTE — CHART NOTE - NSCHARTNOTEFT_GEN_A_CORE
OB/GYN PA NOTE    PA called to room for increased vaginal bleeding    Patient seen and examined at bedside. Patient states she is doing well, lower abdominal cramping pain that is improving with IV Tylenol. Denies lightheadedness, dizziness, shortness of breath, chest pain, severe abdominal cramping, rash, fever/chills    Vitals: ICU Vital Signs Last 24 Hrs  T(C): 36.9 (05 Apr 2023 07:23), Max: 36.9 (05 Apr 2023 07:02)  T(F): 98.4 (05 Apr 2023 07:23), Max: 98.42 (05 Apr 2023 07:02)  HR: 71 (05 Apr 2023 07:23) (71 - 71)  BP: 112/60 (05 Apr 2023 07:23) (112/60 - 112/60)  BP(mean): --  ABP: --  ABP(mean): --  RR: 16 (05 Apr 2023 07:23) (16 - 16)  SpO2: --    Gen: NAD, A+O x 3, pale appearing  Resp: Unlabored  Incision: C/D/O  Abd: Soft, non-distended, non-tender to palpation in all 4 quadrants, no rebound/guarding  Fundus: Firm, midline, at level of umbilicus, appropriately tender to palpation  Lochia: Heavy with blood clots  Ext: Warm, well perfused SCDs bilaterally    Bimanual: COREEN atony with multiple blood clots from COREEN evacuated, fundus firm    Hemabate IM administered  TXA administered  Lochia minimal  CBC and Coags sent, with 2nd IV in place  Discussed with Dr. Rivera Corona (Anesthesia) notified    Increased vaginal bleeding occurred again with blood clots upon expression    Methergine IM Dose # 2 administered  Dr. Stafford at bedside with Dr. Corona (anesthesia)  PATTIE placed without issues, attached to suction    Continue to closely monitor in PACU  Reassess PATTIE in 1 hour  Discussed with Dr. Stafford

## 2023-04-06 LAB
BASOPHILS # BLD AUTO: 0.03 K/UL — SIGNIFICANT CHANGE UP (ref 0–0.2)
BASOPHILS NFR BLD AUTO: 0.2 % — SIGNIFICANT CHANGE UP (ref 0–2)
EOSINOPHIL # BLD AUTO: 0.01 K/UL — SIGNIFICANT CHANGE UP (ref 0–0.5)
EOSINOPHIL NFR BLD AUTO: 0.1 % — SIGNIFICANT CHANGE UP (ref 0–6)
HCT VFR BLD CALC: 31.1 % — LOW (ref 34.5–45)
HGB BLD-MCNC: 10.2 G/DL — LOW (ref 11.5–15.5)
IANC: 9.91 K/UL — HIGH (ref 1.8–7.4)
IMM GRANULOCYTES NFR BLD AUTO: 0.5 % — SIGNIFICANT CHANGE UP (ref 0–0.9)
LYMPHOCYTES # BLD AUTO: 15.6 % — SIGNIFICANT CHANGE UP (ref 13–44)
LYMPHOCYTES # BLD AUTO: 2.02 K/UL — SIGNIFICANT CHANGE UP (ref 1–3.3)
MCHC RBC-ENTMCNC: 31 PG — SIGNIFICANT CHANGE UP (ref 27–34)
MCHC RBC-ENTMCNC: 32.8 GM/DL — SIGNIFICANT CHANGE UP (ref 32–36)
MCV RBC AUTO: 94.5 FL — SIGNIFICANT CHANGE UP (ref 80–100)
MONOCYTES # BLD AUTO: 0.92 K/UL — HIGH (ref 0–0.9)
MONOCYTES NFR BLD AUTO: 7.1 % — SIGNIFICANT CHANGE UP (ref 2–14)
NEUTROPHILS # BLD AUTO: 9.91 K/UL — HIGH (ref 1.8–7.4)
NEUTROPHILS NFR BLD AUTO: 76.5 % — SIGNIFICANT CHANGE UP (ref 43–77)
NRBC # BLD: 0 /100 WBCS — SIGNIFICANT CHANGE UP (ref 0–0)
NRBC # FLD: 0 K/UL — SIGNIFICANT CHANGE UP (ref 0–0)
PLATELET # BLD AUTO: 155 K/UL — SIGNIFICANT CHANGE UP (ref 150–400)
RBC # BLD: 3.29 M/UL — LOW (ref 3.8–5.2)
RBC # FLD: 15.5 % — HIGH (ref 10.3–14.5)
WBC # BLD: 12.96 K/UL — HIGH (ref 3.8–10.5)
WBC # FLD AUTO: 12.96 K/UL — HIGH (ref 3.8–10.5)

## 2023-04-06 RX ORDER — KETOROLAC TROMETHAMINE 30 MG/ML
30 SYRINGE (ML) INJECTION EVERY 6 HOURS
Refills: 0 | Status: DISCONTINUED | OUTPATIENT
Start: 2023-04-06 | End: 2023-04-06

## 2023-04-06 RX ORDER — IBUPROFEN 200 MG
600 TABLET ORAL EVERY 6 HOURS
Refills: 0 | Status: DISCONTINUED | OUTPATIENT
Start: 2023-04-06 | End: 2023-04-08

## 2023-04-06 RX ADMIN — Medication 975 MILLIGRAM(S): at 04:55

## 2023-04-06 RX ADMIN — Medication 0.2 MILLIGRAM(S): at 00:10

## 2023-04-06 RX ADMIN — Medication 975 MILLIGRAM(S): at 15:07

## 2023-04-06 RX ADMIN — HEPARIN SODIUM 5000 UNIT(S): 5000 INJECTION INTRAVENOUS; SUBCUTANEOUS at 18:29

## 2023-04-06 RX ADMIN — Medication 600 MILLIGRAM(S): at 12:25

## 2023-04-06 RX ADMIN — Medication 975 MILLIGRAM(S): at 05:50

## 2023-04-06 RX ADMIN — Medication 0.2 MILLIGRAM(S): at 04:00

## 2023-04-06 RX ADMIN — Medication 0.2 MILLIGRAM(S): at 12:25

## 2023-04-06 RX ADMIN — Medication 975 MILLIGRAM(S): at 21:00

## 2023-04-06 RX ADMIN — Medication 975 MILLIGRAM(S): at 20:28

## 2023-04-06 RX ADMIN — Medication 975 MILLIGRAM(S): at 10:10

## 2023-04-06 RX ADMIN — HEPARIN SODIUM 5000 UNIT(S): 5000 INJECTION INTRAVENOUS; SUBCUTANEOUS at 04:55

## 2023-04-06 RX ADMIN — Medication 600 MILLIGRAM(S): at 18:29

## 2023-04-06 RX ADMIN — Medication 30 MILLIGRAM(S): at 04:00

## 2023-04-06 RX ADMIN — Medication 600 MILLIGRAM(S): at 19:29

## 2023-04-06 RX ADMIN — Medication 975 MILLIGRAM(S): at 00:00

## 2023-04-06 RX ADMIN — Medication 975 MILLIGRAM(S): at 11:30

## 2023-04-06 RX ADMIN — Medication 30 MILLIGRAM(S): at 04:30

## 2023-04-06 RX ADMIN — Medication 600 MILLIGRAM(S): at 13:00

## 2023-04-06 RX ADMIN — Medication 0.2 MILLIGRAM(S): at 07:54

## 2023-04-06 RX ADMIN — Medication 1 APPLICATION(S): at 00:59

## 2023-04-06 NOTE — PROGRESS NOTE ADULT - ASSESSMENT
Assessment/plan    31yo F POD#1, s/p R/C/S c/b PPH (QBL 963ml+330ml)    PPH#  -QBL total 1293ml  -CBC this AM, 10.2/31.1, WBC 12.9  -s/p Methergine x 2, Methergine series, Cytotec UT, Hemabate, TXA  -s/p Alisha  -currently asymptomatic, continue to monitor    Her pain is well controlled.   She is tolerating a regular diet.   Denies N/V. Denies CP/SOB/lightheadedness/dizziness.   She is ambulating without difficulty.   Voiding spontaneously.    Patient is stable and doing well post-operatively.    - Continue regular diet.  - Increase ambulation.  - Continue motrin, tylenol, oxycodone PRN for pain control.   -Encourage breastfeeding.  -Incisional care and PO instructions reviewed.     Follow up @ [facility] in 2 weeks for incision check visit  [phone]    Discussed with  Rivera Love

## 2023-04-07 RX ORDER — SENNA PLUS 8.6 MG/1
1 TABLET ORAL
Refills: 0 | Status: DISCONTINUED | OUTPATIENT
Start: 2023-04-07 | End: 2023-04-08

## 2023-04-07 RX ADMIN — Medication 600 MILLIGRAM(S): at 19:25

## 2023-04-07 RX ADMIN — Medication 600 MILLIGRAM(S): at 17:57

## 2023-04-07 RX ADMIN — HEPARIN SODIUM 5000 UNIT(S): 5000 INJECTION INTRAVENOUS; SUBCUTANEOUS at 17:57

## 2023-04-07 RX ADMIN — SIMETHICONE 80 MILLIGRAM(S): 80 TABLET, CHEWABLE ORAL at 15:31

## 2023-04-07 RX ADMIN — MAGNESIUM HYDROXIDE 30 MILLILITER(S): 400 TABLET, CHEWABLE ORAL at 15:31

## 2023-04-07 RX ADMIN — Medication 600 MILLIGRAM(S): at 01:00

## 2023-04-07 RX ADMIN — Medication 600 MILLIGRAM(S): at 06:25

## 2023-04-07 RX ADMIN — Medication 600 MILLIGRAM(S): at 00:04

## 2023-04-07 RX ADMIN — Medication 600 MILLIGRAM(S): at 12:30

## 2023-04-07 RX ADMIN — Medication 975 MILLIGRAM(S): at 10:30

## 2023-04-07 RX ADMIN — Medication 975 MILLIGRAM(S): at 21:21

## 2023-04-07 RX ADMIN — Medication 600 MILLIGRAM(S): at 05:30

## 2023-04-07 RX ADMIN — HEPARIN SODIUM 5000 UNIT(S): 5000 INJECTION INTRAVENOUS; SUBCUTANEOUS at 05:30

## 2023-04-07 RX ADMIN — SENNA PLUS 1 TABLET(S): 8.6 TABLET ORAL at 17:57

## 2023-04-07 RX ADMIN — Medication 975 MILLIGRAM(S): at 15:31

## 2023-04-07 RX ADMIN — Medication 975 MILLIGRAM(S): at 09:42

## 2023-04-07 RX ADMIN — Medication 975 MILLIGRAM(S): at 16:44

## 2023-04-07 RX ADMIN — Medication 600 MILLIGRAM(S): at 11:51

## 2023-04-07 RX ADMIN — Medication 975 MILLIGRAM(S): at 21:51

## 2023-04-08 VITALS
HEART RATE: 62 BPM | RESPIRATION RATE: 17 BRPM | DIASTOLIC BLOOD PRESSURE: 67 MMHG | SYSTOLIC BLOOD PRESSURE: 106 MMHG | OXYGEN SATURATION: 100 % | TEMPERATURE: 98 F

## 2023-04-08 RX ORDER — IBUPROFEN 200 MG
1 TABLET ORAL
Qty: 0 | Refills: 0 | DISCHARGE
Start: 2023-04-08

## 2023-04-08 RX ORDER — ACETAMINOPHEN 500 MG
3 TABLET ORAL
Qty: 0 | Refills: 0 | DISCHARGE
Start: 2023-04-08

## 2023-04-08 RX ORDER — LANOLIN
1 OINTMENT (GRAM) TOPICAL
Qty: 0 | Refills: 0 | DISCHARGE
Start: 2023-04-08

## 2023-04-08 RX ADMIN — Medication 600 MILLIGRAM(S): at 00:11

## 2023-04-08 RX ADMIN — Medication 600 MILLIGRAM(S): at 05:46

## 2023-04-08 RX ADMIN — HEPARIN SODIUM 5000 UNIT(S): 5000 INJECTION INTRAVENOUS; SUBCUTANEOUS at 05:47

## 2023-04-08 RX ADMIN — Medication 600 MILLIGRAM(S): at 06:16

## 2023-04-08 RX ADMIN — Medication 600 MILLIGRAM(S): at 00:41

## 2023-04-08 RX ADMIN — Medication 600 MILLIGRAM(S): at 13:01

## 2023-04-08 RX ADMIN — Medication 975 MILLIGRAM(S): at 10:00

## 2023-04-08 RX ADMIN — Medication 600 MILLIGRAM(S): at 01:34

## 2023-04-08 RX ADMIN — Medication 975 MILLIGRAM(S): at 09:15

## 2023-04-08 RX ADMIN — Medication 0.5 MILLILITER(S): at 14:07

## 2023-04-08 NOTE — PROGRESS NOTE ADULT - SUBJECTIVE AND OBJECTIVE BOX
Pain Service Follow-up  Postop Day  1    S/P  C- Section    T(C): 36.6 (04-06-23 @ 09:08), Max: 36.9 (04-05-23 @ 16:30)  HR: 74 (04-06-23 @ 09:08) (57 - 74)  BP: 98/51 (04-06-23 @ 09:08) (80/59 - 165/90)  RR: 18 (04-06-23 @ 09:08) (13 - 21)  SpO2: 100% (04-06-23 @ 09:08) (96% - 100%)  Wt(kg): --      THERAPY:  Spinal Morphine     Sedation Score:	  [X] Alert	      [  ] Drowsy       [  ] Arousable	[  ] Asleep         [  ] Unresponsive    Side Effects:	  [X] None	      [  ] Nausea       [  ] Pruritus        [  ] Weakness   [  ] Numbness        ASSESSMENT/ PLAN   [ X ] Discontinue         [  ] Continue    [ X ] Documentation and Verification of current medications       Satisfactory Post Anesthetic Course    
Post-Operative Note, C/S  She is a  32y woman who is now post-operative day: 2    Subjective:  The patient feels well.  She is ambulating.   She is tolerating regular diet.  She denies nausea and vomiting; denies fever.  She is voiding.  Her pain is controlled; incisional pain is appropriate.  She reports normal postpartum bleeding.  Denies passing flatus    Physical exam:    Vital Signs Last 24 Hrs  T(C): 36.9 (07 Apr 2023 14:22), Max: 37.1 (06 Apr 2023 22:42)  T(F): 98.4 (07 Apr 2023 14:22), Max: 98.8 (06 Apr 2023 22:42)  HR: 73 (07 Apr 2023 14:22) (58 - 97)  BP: 105/68 (07 Apr 2023 14:22) (99/52 - 115/69)  BP(mean): --  RR: 18 (07 Apr 2023 14:22) (16 - 18)  SpO2: 100% (07 Apr 2023 14:22) (97% - 100%)    Parameters below as of 07 Apr 2023 14:22  Patient On (Oxygen Delivery Method): room air        Gen: NAD  Breast: Soft, nontender, not engorged.  Abdomen: Soft, nontender, no distension , firm uterine fundus at umbilicus.  Incision: C/D/I.  Pelvic: Normal lochia noted  Ext: No calf tenderness    LABS:                        10.2   12.96 )-----------( 155      ( 06 Apr 2023 05:40 )             31.1       Rubella status:     Allergies    penicillin (Unknown)    Intolerances      MEDICATIONS  (STANDING):  acetaminophen     Tablet .. 975 milliGRAM(s) Oral <User Schedule>  diphtheria/tetanus/pertussis (acellular) Vaccine (Adacel) 0.5 milliLiter(s) IntraMuscular once  heparin   Injectable 5000 Unit(s) SubCutaneous every 12 hours  ibuprofen  Tablet. 600 milliGRAM(s) Oral every 6 hours  lactated ringers. 1000 milliLiter(s) (125 mL/Hr) IV Continuous <Continuous>  lactated ringers. 1000 milliLiter(s) (75 mL/Hr) IV Continuous <Continuous>  morphine PF Spinal 0.1 milliGRAM(s) IntraThecal. once  oxytocin Infusion 333.333 milliUNIT(s)/Min (1000 mL/Hr) IV Continuous <Continuous>    MEDICATIONS  (PRN):  dexAMETHasone  Injectable 4 milliGRAM(s) IV Push every 6 hours PRN Nausea  diphenhydrAMINE 25 milliGRAM(s) Oral every 6 hours PRN Pruritus  lanolin Ointment 1 Application(s) Topical every 6 hours PRN Sore Nipples  magnesium hydroxide Suspension 30 milliLiter(s) Oral two times a day PRN Constipation  naloxone Injectable 0.1 milliGRAM(s) IV Push every 3 minutes PRN For ANY of the following changes in patient status:  A. Breaths Per Minute LESS THAN 10, B. Oxygen saturation LESS THAN 90%, C. Sedation score of 6 for Stop After: 4 Times  ondansetron Injectable 4 milliGRAM(s) IV Push every 6 hours PRN Nausea  oxyCODONE    IR 5 milliGRAM(s) Oral once PRN Moderate to Severe Pain (4-10)  oxyCODONE    IR 5 milliGRAM(s) Oral every 3 hours PRN Moderate to Severe Pain (4-10)  senna 1 Tablet(s) Oral two times a day PRN Constipation  simethicone 80 milliGRAM(s) Chew every 4 hours PRN Gas        Assessment and Plan  POD #2 s/p C/S.  Doing well.  Encourage ambulation.  Counseled about passing gas, recommendations given, +BS  Incisional care and PO instructions reviewed.  CPC.        
SUBJECTIVE:    Pain: Controlled    Complaints: None    MILESTONES:    Alert and Oriented x 3  [ x ]  Out of bed/ ambulating. [ x ]  Flatus:   Positive [ x ]  Negative [  ]  Bowel movement  [  ] Positive [  ] Negative   Voiding [x  ] Due to void [  ]   Antonio/Indwelling catheter in place [  ]  Diet: Regular [ x ]  Clears [  ]  NPO [  ]    Infant feeding:  Breast [ X ]   Bottle [  ]  Both [ X ]  Feeding related issues and/or concerns:      OBJECTIVE:  T(C): 36.9 (23 @ 14:22), Max: 37.1 (23 @ 22:42)  HR: 73 (23 @ 14:22) (58 - 97)  BP: 105/68 (23 @ 14:22) (99/52 - 115/69)  RR: 18 (23 @ 14:22) (16 - 18)  SpO2: 100% (23 @ 14:22) (97% - 100%)  Wt(kg): --                        10.2   12.96 )-----------( 155      ( 2023 05:40 )             31.1           Blood Type: AB Positive    RPR: Negative          MEDICATIONS  (STANDING):  acetaminophen     Tablet .. 975 milliGRAM(s) Oral <User Schedule>  diphtheria/tetanus/pertussis (acellular) Vaccine (Adacel) 0.5 milliLiter(s) IntraMuscular once  heparin   Injectable 5000 Unit(s) SubCutaneous every 12 hours  ibuprofen  Tablet. 600 milliGRAM(s) Oral every 6 hours  lactated ringers. 1000 milliLiter(s) (125 mL/Hr) IV Continuous <Continuous>  lactated ringers. 1000 milliLiter(s) (75 mL/Hr) IV Continuous <Continuous>  morphine PF Spinal 0.1 milliGRAM(s) IntraThecal. once  oxytocin Infusion 333.333 milliUNIT(s)/Min (1000 mL/Hr) IV Continuous <Continuous>    MEDICATIONS  (PRN):  dexAMETHasone  Injectable 4 milliGRAM(s) IV Push every 6 hours PRN Nausea  diphenhydrAMINE 25 milliGRAM(s) Oral every 6 hours PRN Pruritus  lanolin Ointment 1 Application(s) Topical every 6 hours PRN Sore Nipples  magnesium hydroxide Suspension 30 milliLiter(s) Oral two times a day PRN Constipation  naloxone Injectable 0.1 milliGRAM(s) IV Push every 3 minutes PRN For ANY of the following changes in patient status:  A. Breaths Per Minute LESS THAN 10, B. Oxygen saturation LESS THAN 90%, C. Sedation score of 6 for Stop After: 4 Times  ondansetron Injectable 4 milliGRAM(s) IV Push every 6 hours PRN Nausea  oxyCODONE    IR 5 milliGRAM(s) Oral once PRN Moderate to Severe Pain (4-10)  oxyCODONE    IR 5 milliGRAM(s) Oral every 3 hours PRN Moderate to Severe Pain (4-10)  simethicone 80 milliGRAM(s) Chew every 4 hours PRN Gas        ASSESSMENT:    32y     G 2     P       PO Day#  2        Delivery: Primary [  ]    Repeat [ X ]       QBL - 963 + 330- PPH = 1293  (See delivery summary and related documentation)                                  Indication of procedure: Scheduled    Condition: Stable    Past Medical History significant for: HPI:   32 year old  @ 39.0 weeks, RAMIREZ 2023 dated by LMP (22) consistent with 1st Trimester US, presents to L&D for scheduled repeat  secondary to history of prior . NPO TIME 10 pm last night. Patient admits to normal fetal movement. Patient also admits to intermittent lower abdominal cramping, every 20 minutes. Denies vaginal bleeding, leakage of fluid, painful contractions, fever/chills, shortness of breath,  chest pain, increased swelling, difficulty ambulating, loss of taste/smell, nausea/vomiting/diarrhea, rash, weakness, paresthesia, change in appetite, dizziness, lightheadedness, cough, nasal congestion, runny nose    Antepartum Course:  1. Elevated GCT, GTT wnl  2. Resolved low lying placenta  3. GBS Positive 3/  4. History of prior        Allergy: PCN - Hives/Rash (childhood rash as per mother) states unsure if taken Cephazolin / PCN since         (2023 07:30)      Current Issues:    Breasts:  Soft [x  ]   Engorged [  ]  Nipples:  Abdomen: Soft [ x ]   Distended [  ] Nontender [  ]     Bowel sounds :  Present [  ]  Absent [  ]   Fundus:  Firm [x  ]  Boggy [  ]    Abdominal incision: Clean, Dry and Intact [x  ]  Staples [  ] Steri Strips [  ] Dermabond [ X ] Sutures [  ]    Patient wearing abdominal binder for support.    Vaginal: Lochia:  Heavy [  ]  Moderate [ x ]   Scant [  ]    Extremities: Edema [  ] Negative Bill's Sign [ X ] Nontender Carlos  [ x ] Positive pedal pulses [  ]    Other relevant physical exam findings:      PLAN:    Plan: Increase ambulation, analgesia PRN and pain medication protocol standing oxycodone, ibuprofen and acetaminophen.    Diet: Regular diet    Continue routine post-operative and postpartum care.  Mylicon, MOM, and Senna as needed.    Discharge Planning [ x ]    For Discharge Today  [    ]    Consults:  Social Work [  ]  Lactation [ x ]  Other [         ]   
Post-Operative Note, C/S  She is a  32y woman who is now post-operative day: 1    Subjective:  The patient feels well.  She is ambulating.   She is tolerating regular diet.  She denies nausea and vomiting; denies fever.  She is voiding.  Her pain is controlled; incisional pain is appropriate.  She reports normal postpartum bleeding.  She is breastfeeding.  She is formula feeding.    Physical exam:    Vital Signs Last 24 Hrs  T(C): 36.7 (06 Apr 2023 05:53), Max: 36.9 (05 Apr 2023 16:30)  T(F): 98.1 (06 Apr 2023 05:53), Max: 98.5 (06 Apr 2023 01:29)  HR: 60 (06 Apr 2023 05:53) (54 - 73)  BP: 102/55 (06 Apr 2023 05:53) (80/59 - 165/90)  BP(mean): 83 (05 Apr 2023 17:00) (57 - 125)  RR: 17 (06 Apr 2023 05:53) (11 - 21)  SpO2: 98% (06 Apr 2023 05:53) (96% - 100%)    Parameters below as of 06 Apr 2023 05:53  Patient On (Oxygen Delivery Method): room air        Gen: NAD  Breast: Soft, nontender, not engorged.  Abdomen: Soft, nontender, no distension , firm uterine fundus at umbilicus.  Incision: C/D/I.  Pelvic: Normal lochia noted  Ext: No calf tenderness    LABS:                        10.2   12.96 )-----------( 155      ( 06 Apr 2023 05:40 )             31.1       Rubella status:     Allergies    penicillin (Unknown)    Intolerances      MEDICATIONS  (STANDING):  acetaminophen     Tablet .. 975 milliGRAM(s) Oral <User Schedule>  diphtheria/tetanus/pertussis (acellular) Vaccine (Adacel) 0.5 milliLiter(s) IntraMuscular once  heparin   Injectable 5000 Unit(s) SubCutaneous every 12 hours  ibuprofen  Tablet. 600 milliGRAM(s) Oral every 6 hours  lactated ringers. 1000 milliLiter(s) (125 mL/Hr) IV Continuous <Continuous>  lactated ringers. 1000 milliLiter(s) (75 mL/Hr) IV Continuous <Continuous>  methylergonovine 0.2 milliGRAM(s) Oral every 4 hours  morphine PF Spinal 0.1 milliGRAM(s) IntraThecal. once  oxytocin Infusion 333.333 milliUNIT(s)/Min (1000 mL/Hr) IV Continuous <Continuous>    MEDICATIONS  (PRN):  dexAMETHasone  Injectable 4 milliGRAM(s) IV Push every 6 hours PRN Nausea  diphenhydrAMINE 25 milliGRAM(s) Oral every 6 hours PRN Pruritus  lanolin Ointment 1 Application(s) Topical every 6 hours PRN Sore Nipples  magnesium hydroxide Suspension 30 milliLiter(s) Oral two times a day PRN Constipation  naloxone Injectable 0.1 milliGRAM(s) IV Push every 3 minutes PRN For ANY of the following changes in patient status:  A. Breaths Per Minute LESS THAN 10, B. Oxygen saturation LESS THAN 90%, C. Sedation score of 6 for Stop After: 4 Times  ondansetron Injectable 4 milliGRAM(s) IV Push every 6 hours PRN Nausea  oxyCODONE    IR 5 milliGRAM(s) Oral once PRN Moderate to Severe Pain (4-10)  oxyCODONE    IR 5 milliGRAM(s) Oral every 3 hours PRN Moderate to Severe Pain (4-10)  simethicone 80 milliGRAM(s) Chew every 4 hours PRN Gas            
R1 Progress Note    Patient seen and examined at bedside, no acute overnight events. No acute complaints, pain well controlled. Patient is ambulating and tolerating regular diet. Has passed flatus. Patient voiding independently. Denies CP, SOB, N/V, HA, dizziness, lightheadedness. Bleeding minimal.    Vital Signs Last 24 Hours  T(C): 36.7 (04-08-23 @ 06:13), Max: 37 (04-07-23 @ 10:33)  HR: 62 (04-08-23 @ 06:13) (61 - 97)  BP: 106/67 (04-08-23 @ 06:13) (104/66 - 115/69)  RR: 17 (04-08-23 @ 06:13) (16 - 18)  SpO2: 100% (04-08-23 @ 06:13) (98% - 100%)    I&O's Summary      Physical Exam:  General: NAD  Abdomen: Soft, non-tender, non-distended, fundus firm  Incision: Pfannenstiel incision CDI, subcuticular suture closure  Pelvic: Lochia wnl    Labs:    Blood Type: AB Positive  Antibody Screen: Negative  RPR: Negative               10.2   12.96 )-----------( 155      ( 04-06 @ 05:40 )             31.1                12.1   14.80 )-----------( 173      ( 04-05 @ 14:25 )             36.5                11.4   8.58  )-----------( 174      ( 04-05 @ 11:20 )             35.9         MEDICATIONS  (STANDING):  acetaminophen     Tablet .. 975 milliGRAM(s) Oral <User Schedule>  diphtheria/tetanus/pertussis (acellular) Vaccine (Adacel) 0.5 milliLiter(s) IntraMuscular once  heparin   Injectable 5000 Unit(s) SubCutaneous every 12 hours  ibuprofen  Tablet. 600 milliGRAM(s) Oral every 6 hours  morphine PF Spinal 0.1 milliGRAM(s) IntraThecal. once    MEDICATIONS  (PRN):  dexAMETHasone  Injectable 4 milliGRAM(s) IV Push every 6 hours PRN Nausea  diphenhydrAMINE 25 milliGRAM(s) Oral every 6 hours PRN Pruritus  lanolin Ointment 1 Application(s) Topical every 6 hours PRN Sore Nipples  magnesium hydroxide Suspension 30 milliLiter(s) Oral two times a day PRN Constipation  naloxone Injectable 0.1 milliGRAM(s) IV Push every 3 minutes PRN For ANY of the following changes in patient status:  A. Breaths Per Minute LESS THAN 10, B. Oxygen saturation LESS THAN 90%, C. Sedation score of 6 for Stop After: 4 Times  ondansetron Injectable 4 milliGRAM(s) IV Push every 6 hours PRN Nausea  oxyCODONE    IR 5 milliGRAM(s) Oral once PRN Moderate to Severe Pain (4-10)  oxyCODONE    IR 5 milliGRAM(s) Oral every 3 hours PRN Moderate to Severe Pain (4-10)  senna 1 Tablet(s) Oral two times a day PRN Constipation  simethicone 80 milliGRAM(s) Chew every 4 hours PRN Gas

## 2023-04-08 NOTE — PROGRESS NOTE ADULT - ASSESSMENT
31[facility]  [facility]. Overall, patient stable and recovering well postpartum.    #PPH  - Hct: 37.6->36.8->41.1->32.9  - s/p TXA, IM methergine x2, hemabate, rectal cyto, Methergine series  - s/p PATTIE  - Asymptomatic, continue to monitor    #Postpartum state  - Continue with po analgesia  - Increase ambulation  - Continue regular diet  - DVT prophylaxis with 5000u Heparin    Jasmyne Kelley  PGY-1

## 2023-07-11 NOTE — OB PROVIDER H&P - NS_FETALPRESENTATIONA_OBGYN_ALL_OB
Cephalic Post-Care Instructions: I reviewed with the patient in detail post-care instructions. Patient is not to engage in any heavy lifting, exercise, or swimming for the next 14 days. Should the patient develop any fevers, chills, bleeding, severe pain patient will contact the office immediately.

## 2024-04-30 NOTE — OB RN PATIENT PROFILE - BRADEN SCORE (IF 18 OR LESS ACTIVATE SKIN INJURY RISK INCREASED GUIDELINE), MLM
Step 1: Below are statements about things you do in everyday life.  For each statement, check how well you do it.  If an item does not apply to you, cross it out and move to the next item. Step 2: Next, for each statement, [place] how important this is for you. Step 3: Choose up to 4 priority areas which you would like to target for improvement     Example N/A A lot of difficulty Some difficulty Well Extremely well Important More Important Most Important I would like to Prioritize   Taking care of myself [] [] [x] [] [] [] [x] [] x   Getting where I need to go [] [] [x] [] [] [] [x] []    Managing my finances [] [] [x] [] [] [] [] [x]    Managing by basic needs (food, medicine) [] [] [x] [] [] [] [x] []    Identifying and solving problems [] [] [x] [] [] [] [] [x]    Getting done what I need to do [] [x] [] [] [] [] [] [x]    Having a satisfying routine [] [x] [] [] [] [] [] [x] x   Handling my responsibilities [] [x] [] [] [] [] [] [x] x   Being involved as a student, worker, volunteer, and/or family member [] [x] [] [] [] [] [] [x]    Working towards my goals [] [] [x] [] [] [] [] [x] x   Making decisions based on what I think is important [x] [] [] [] [] [] [] [x]    Effectively using my abilities [x] [] [] [] [] [] [x] []        Writer contacted/provided pt with the Occupational Self Assessment to complete and indicate her PERCEIVED COMPETENCE in performing everyday activities PTA. Pt completed the form independently.  Per record/intake, pt was admitted on a voluntary basis.   Per record, pt does have outpatient providers.   Per record/intake, patient has history of obsessive-compulsive personality disorder, social anxiety disorder, generalized anxiety disorder, PTSD, and recurrent major depressive disorder.  Pt reported SI and AVH. Denied HI.   Pt additionally reported her mental health has been deteriorating over the past few months in the context of new onset GI issues that started 6 months ago, stress r/t  Yes, that is fine.   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 Pt states that since her twin brother passed away, she has been experiencing “paranoia of everything and everybody.\"  Basic needs appeared to be met as patient has stable housing, a supportive family, and is employed. She is currently on leave of absence from her job.   Pt admitted to nightly use of THC. Denies other substances or ETOH abuse.   Per intake, pt reported compliance with medications, PTA.   Pt's physical status appears stable and as a result, pt is likely physically capable of completing routine personal cares and home management tasks independently.  Positive daily routine and structure appear limited at this time.   Areas targeted as priorities for improvement have been identified by pt above.  While on the unit pt would likely benefit from involvement in programming to address her identified concerns and to support her recovery. She will be encouraged to attend and actively participate in groups.    ISAMAR Frank     23

## 2024-11-13 NOTE — LACTATION INITIAL EVALUATION - LATCH: AUDIBLE SWALLOWING INFANT
a few with stimulation
 4-calculated by average/Not able to assess (calculate score using [facility] averaging method)

## 2025-07-29 ENCOUNTER — NON-APPOINTMENT (OUTPATIENT)
Age: 35
End: 2025-07-29